# Patient Record
Sex: FEMALE | Race: WHITE | Employment: OTHER | URBAN - NONMETROPOLITAN AREA
[De-identification: names, ages, dates, MRNs, and addresses within clinical notes are randomized per-mention and may not be internally consistent; named-entity substitution may affect disease eponyms.]

---

## 2017-03-24 RX ORDER — FLECAINIDE ACETATE 50 MG/1
TABLET ORAL
Qty: 180 TABLET | Refills: 1 | Status: SHIPPED | OUTPATIENT
Start: 2017-03-24 | End: 2017-05-22 | Stop reason: SDUPTHER

## 2017-03-24 RX ORDER — METOPROLOL SUCCINATE 50 MG/1
TABLET, EXTENDED RELEASE ORAL
Qty: 90 TABLET | Refills: 1 | Status: SHIPPED | OUTPATIENT
Start: 2017-03-24 | End: 2017-05-22 | Stop reason: SDUPTHER

## 2017-03-24 RX ORDER — LISINOPRIL 20 MG/1
TABLET ORAL
Qty: 90 TABLET | Refills: 1 | Status: SHIPPED | OUTPATIENT
Start: 2017-03-24 | End: 2017-05-22 | Stop reason: SDUPTHER

## 2017-05-22 ENCOUNTER — OFFICE VISIT (OUTPATIENT)
Dept: CARDIOLOGY CLINIC | Age: 82
End: 2017-05-22

## 2017-05-22 VITALS
RESPIRATION RATE: 16 BRPM | HEART RATE: 47 BPM | DIASTOLIC BLOOD PRESSURE: 80 MMHG | SYSTOLIC BLOOD PRESSURE: 136 MMHG | WEIGHT: 148 LBS | HEIGHT: 60 IN | BODY MASS INDEX: 29.06 KG/M2

## 2017-05-22 DIAGNOSIS — I48.0 PAF (PAROXYSMAL ATRIAL FIBRILLATION) (HCC): Primary | ICD-10-CM

## 2017-05-22 DIAGNOSIS — R00.1 BRADYCARDIA: ICD-10-CM

## 2017-05-22 DIAGNOSIS — Z91.81 RISK FOR FALLS: ICD-10-CM

## 2017-05-22 DIAGNOSIS — I10 ESSENTIAL HYPERTENSION: ICD-10-CM

## 2017-05-22 DIAGNOSIS — H91.93 HOH (HARD OF HEARING), BILATERAL: ICD-10-CM

## 2017-05-22 PROCEDURE — 99214 OFFICE O/P EST MOD 30 MIN: CPT | Performed by: INTERNAL MEDICINE

## 2017-05-22 PROCEDURE — 1036F TOBACCO NON-USER: CPT | Performed by: INTERNAL MEDICINE

## 2017-05-22 PROCEDURE — G8420 CALC BMI NORM PARAMETERS: HCPCS | Performed by: INTERNAL MEDICINE

## 2017-05-22 PROCEDURE — 93000 ELECTROCARDIOGRAM COMPLETE: CPT | Performed by: INTERNAL MEDICINE

## 2017-05-22 PROCEDURE — G8428 CUR MEDS NOT DOCUMENT: HCPCS | Performed by: INTERNAL MEDICINE

## 2017-05-22 PROCEDURE — 1123F ACP DISCUSS/DSCN MKR DOCD: CPT | Performed by: INTERNAL MEDICINE

## 2017-05-22 PROCEDURE — 4040F PNEUMOC VAC/ADMIN/RCVD: CPT | Performed by: INTERNAL MEDICINE

## 2017-05-22 PROCEDURE — 1090F PRES/ABSN URINE INCON ASSESS: CPT | Performed by: INTERNAL MEDICINE

## 2017-05-22 RX ORDER — METOPROLOL SUCCINATE 25 MG/1
TABLET, EXTENDED RELEASE ORAL
Qty: 90 TABLET | Refills: 3 | Status: SHIPPED | OUTPATIENT
Start: 2017-05-22 | End: 2018-06-25 | Stop reason: SDUPTHER

## 2017-05-22 RX ORDER — FLECAINIDE ACETATE 50 MG/1
TABLET ORAL
Qty: 180 TABLET | Refills: 3 | Status: SHIPPED | OUTPATIENT
Start: 2017-05-22 | End: 2018-06-25 | Stop reason: SDUPTHER

## 2017-05-22 RX ORDER — LISINOPRIL 20 MG/1
TABLET ORAL
Qty: 90 TABLET | Refills: 3 | Status: SHIPPED | OUTPATIENT
Start: 2017-05-22 | End: 2018-06-25 | Stop reason: SDUPTHER

## 2017-05-23 ENCOUNTER — TELEPHONE (OUTPATIENT)
Dept: CARDIOLOGY CLINIC | Age: 82
End: 2017-05-23

## 2017-05-24 ENCOUNTER — TELEPHONE (OUTPATIENT)
Dept: CARDIOLOGY CLINIC | Age: 82
End: 2017-05-24

## 2018-06-25 ENCOUNTER — TELEPHONE (OUTPATIENT)
Dept: CARDIOLOGY CLINIC | Age: 83
End: 2018-06-25

## 2018-06-25 RX ORDER — METOPROLOL SUCCINATE 25 MG/1
TABLET, EXTENDED RELEASE ORAL
Qty: 90 TABLET | Refills: 3 | Status: SHIPPED | OUTPATIENT
Start: 2018-06-25 | End: 2019-06-01 | Stop reason: SDUPTHER

## 2018-06-25 RX ORDER — LISINOPRIL 20 MG/1
TABLET ORAL
Qty: 90 TABLET | Refills: 3 | Status: SHIPPED | OUTPATIENT
Start: 2018-06-25 | End: 2019-06-01 | Stop reason: SDUPTHER

## 2018-06-25 RX ORDER — FLECAINIDE ACETATE 50 MG/1
TABLET ORAL
Qty: 180 TABLET | Refills: 3 | Status: SHIPPED | OUTPATIENT
Start: 2018-06-25 | End: 2019-06-01 | Stop reason: SDUPTHER

## 2018-12-24 ENCOUNTER — HOSPITAL ENCOUNTER (EMERGENCY)
Age: 83
Discharge: HOME | End: 2018-12-24
Attending: EMERGENCY MEDICINE
Payer: MEDICARE

## 2018-12-24 ENCOUNTER — APPOINTMENT (OUTPATIENT)
Dept: GENERAL RADIOLOGY | Age: 83
End: 2018-12-24
Payer: MEDICARE

## 2018-12-24 VITALS
WEIGHT: 155 LBS | HEIGHT: 60 IN | OXYGEN SATURATION: 96 % | RESPIRATION RATE: 16 BRPM | SYSTOLIC BLOOD PRESSURE: 96 MMHG | TEMPERATURE: 101 F | HEART RATE: 93 BPM | BODY MASS INDEX: 30.43 KG/M2 | DIASTOLIC BLOOD PRESSURE: 77 MMHG

## 2018-12-24 DIAGNOSIS — N39.0 URINARY TRACT INFECTION WITH HEMATURIA, SITE UNSPECIFIED: ICD-10-CM

## 2018-12-24 DIAGNOSIS — R30.0 DYSURIA: Primary | ICD-10-CM

## 2018-12-24 DIAGNOSIS — R50.9 FEVER, UNSPECIFIED FEVER CAUSE: ICD-10-CM

## 2018-12-24 DIAGNOSIS — R65.10 SIRS (SYSTEMIC INFLAMMATORY RESPONSE SYNDROME) (HCC): ICD-10-CM

## 2018-12-24 DIAGNOSIS — R31.9 URINARY TRACT INFECTION WITH HEMATURIA, SITE UNSPECIFIED: ICD-10-CM

## 2018-12-24 LAB
BACTERIA: ABNORMAL /HPF
BILIRUBIN URINE: NEGATIVE MG/DL
BLOOD, URINE: ABNORMAL
CAST TYPE: ABNORMAL /HPF
CLARITY: ABNORMAL
COLOR: YELLOW
CRYSTAL TYPE: ABNORMAL /HPF
EPITHELIAL CELLS, UA: ABNORMAL /HPF
GLUCOSE, URINE: NEGATIVE MG/DL
KETONES, URINE: NEGATIVE MG/DL
LEUKOCYTE ESTERASE, URINE: ABNORMAL
MUCUS: NEGATIVE HPF
NITRITE URINE, QUANTITATIVE: NEGATIVE
PH, URINE: 5 (ref 5–8)
PROTEIN UA BY SSA: ABNORMAL MG% (ref 0–10)
PROTEIN UA: 30 MG/DL
RBC URINE: ABNORMAL /HPF (ref 0–6)
SPECIFIC GRAVITY UA: 1.02 (ref 1–1.03)
UROBILINOGEN, URINE: 0.2 MG/DL (ref 0.2–1)
VOLUME, (UVOL): 12 ML (ref 10–12)
WBC UA: ABNORMAL /HPF (ref 0–5)

## 2018-12-24 PROCEDURE — 87086 URINE CULTURE/COLONY COUNT: CPT

## 2018-12-24 PROCEDURE — 71045 X-RAY EXAM CHEST 1 VIEW: CPT

## 2018-12-24 PROCEDURE — 99283 EMERGENCY DEPT VISIT LOW MDM: CPT

## 2018-12-24 PROCEDURE — 87077 CULTURE AEROBIC IDENTIFY: CPT

## 2018-12-24 PROCEDURE — 87186 SC STD MICRODIL/AGAR DIL: CPT

## 2018-12-24 PROCEDURE — 6370000000 HC RX 637 (ALT 250 FOR IP): Performed by: EMERGENCY MEDICINE

## 2018-12-24 PROCEDURE — 81001 URINALYSIS AUTO W/SCOPE: CPT

## 2018-12-24 RX ORDER — CEPHALEXIN 500 MG/1
500 CAPSULE ORAL 2 TIMES DAILY
Qty: 20 CAPSULE | Refills: 0 | Status: SHIPPED | OUTPATIENT
Start: 2018-12-24 | End: 2019-01-03

## 2018-12-24 RX ORDER — 0.9 % SODIUM CHLORIDE 0.9 %
1000 INTRAVENOUS SOLUTION INTRAVENOUS ONCE
Status: DISCONTINUED | OUTPATIENT
Start: 2018-12-24 | End: 2018-12-24 | Stop reason: HOSPADM

## 2018-12-24 RX ORDER — CEPHALEXIN 500 MG/1
500 CAPSULE ORAL ONCE
Status: COMPLETED | OUTPATIENT
Start: 2018-12-24 | End: 2018-12-24

## 2018-12-24 RX ORDER — ACETAMINOPHEN 500 MG
1000 TABLET ORAL ONCE
Status: COMPLETED | OUTPATIENT
Start: 2018-12-24 | End: 2018-12-24

## 2018-12-24 RX ADMIN — ACETAMINOPHEN 1000 MG: 500 TABLET, FILM COATED ORAL at 20:27

## 2018-12-24 RX ADMIN — CEPHALEXIN 500 MG: 500 CAPSULE ORAL at 20:35

## 2018-12-24 ASSESSMENT — ENCOUNTER SYMPTOMS
RESPIRATORY NEGATIVE: 1
ALLERGIC/IMMUNOLOGIC NEGATIVE: 1
EYES NEGATIVE: 1
GASTROINTESTINAL NEGATIVE: 1

## 2018-12-24 NOTE — ED PROVIDER NOTES
[address] [facility]:   Dysuria (Arrives via private auto with her niece with complaints of cough,fever. ) and Fever      [place]:  Steve Paige is a 80 y.o. female that presents with a complaint of confusion, possible urinary tract infection. Patient was at home with niece. He states patient's otherwise healthy today noticed some increased confusion thought she had a UTI. Patient on arrival is alert and oriented ×3 she is in no distress she has no complaints and denies any falls or trauma. Patient denies any headache or chest pain or shortness of breath. He states patient has-been coughing. On arrival patient has a fever they were not aware home. No travel no contacts. No other questions or concerns. Patient denies abdominal pain diarrhea urinary complaints. No history of UTI. Kaushik Borden REVIEW OF SYSTEMS:  At least 10 systems reviewed and otherwise acutely negative except as in the [address] Review of Systems   Constitutional: Positive for fatigue. HENT: Negative. Eyes: Negative. Respiratory: Negative. Cardiovascular: Negative. Gastrointestinal: Negative. Endocrine: Negative. Genitourinary: Positive for dysuria. Musculoskeletal: Negative. Allergic/Immunologic: Negative. Neurological: Negative. Hematological: Negative. Psychiatric/Behavioral: Positive for confusion. All other systems reviewed and are negative.       Past Medical History:   Diagnosis Date    Bradycardia 5/22/2017    Glaucoma     H/O cardiovascular stress test 4/14/2011    normal pattern of perfusion in all regions, ef 70    H/O echocardiogram 4/14/2011    mild concentric lvh with preserved systolic and abnormal diastolic fxn BP>65    [place] (hard of hearing) 10/22/2014    Hypertension     PAF (paroxysmal atrial fibrillation) ([facility] [address]     resolved on Flecainide    Risk for falls 5/22/2017    Thyroid disease      Past Surgical History:   Procedure Laterality Date    HYSTERECTOMY exhibits no distension, no pulsatile midline mass and no mass. There is no tenderness. There is no rigidity, no rebound, no guarding, no tenderness at McBurney's point and negative Longoria's sign. No hernia. Musculoskeletal: Normal range of motion. She exhibits no edema, tenderness or deformity. Neurological: She is alert and oriented to person, place, and time. She has normal strength. She is not disoriented. She displays no atrophy and no tremor. No cranial nerve deficit or sensory deficit. She exhibits normal muscle tone. She displays no seizure activity. Coordination and gait normal. GCS eye subscore is 4. GCS verbal subscore is 5. GCS motor subscore is 6. Skin: Skin is warm. No rash noted. She is not diaphoretic. No erythema. No pallor. Psychiatric: She has a normal mood and affect. Her behavior is normal. Judgment and thought content normal.   Nursing note and vitals reviewed.       I have reviewed andinterpreted all of the currently available lab results from this visit (if applicable):    Results for orders placed or performed during the hospital encounter of 12/24/18   Urinalysis (Lab)   Result Value Ref Range    Color, UA YELLOW UYELL    Clarity, UA TURBID (A) CLEAR    Glucose, Urine NEGATIVE NEG MG/DL    Bilirubin Urine NEGATIVE NEG MG/DL    Ketones, Urine NEGATIVE NEG MG/DL    Specific Gravity, UA 1.025 1.001 - 1.035    Blood, Urine LARGE (A) NEG    pH, Urine 5.0 5.0 - 8.0    Protein, UA 30 (A) NEG MG/DL    Urobilinogen, Urine 0.2 0.2 - 1.0 MG/DL    Nitrite Urine, Quantitative NEGATIVE NEG    Leukocyte Esterase, Urine LARGE (A) NEG    Volume, (UVOL) 12 10 - 12 ML    PROTEIN UA BY SSA NOT PERFORMED 0 - 10 MG%    RBC, UA 0 TO 3 0 - 6 /HPF    WBC, UA 15 TO 25 0 - 5 /HPF    Epi Cells 0 TO 2 /HPF    Cast Type NO CAST FORMS SEEN NCFS /HPF    Bacteria, UA MODERATE (A) NEG /HPF    Crystal Type NONE SEEN NEG /HPF    Mucus, UA NEGATIVE NEG HPF        Radiographs (if obtained):  [] The following radiograph

## 2018-12-27 LAB
CULTURE: NORMAL
Lab: NORMAL
ORGANISM: NORMAL
REPORT STATUS: NORMAL
SPECIMEN: NORMAL
TOTAL COLONY COUNT: NORMAL

## 2019-06-03 RX ORDER — FLECAINIDE ACETATE 50 MG/1
TABLET ORAL
Qty: 180 TABLET | Refills: 2 | Status: SHIPPED | OUTPATIENT
Start: 2019-06-03 | End: 2020-03-09 | Stop reason: SDUPTHER

## 2019-06-03 RX ORDER — LISINOPRIL 20 MG/1
TABLET ORAL
Qty: 90 TABLET | Refills: 2 | Status: SHIPPED | OUTPATIENT
Start: 2019-06-03 | End: 2020-03-09 | Stop reason: SDUPTHER

## 2019-06-03 RX ORDER — METOPROLOL SUCCINATE 25 MG/1
TABLET, EXTENDED RELEASE ORAL
Qty: 90 TABLET | Refills: 2 | Status: SHIPPED | OUTPATIENT
Start: 2019-06-03 | End: 2020-03-09 | Stop reason: SDUPTHER

## 2020-01-29 ENCOUNTER — HOSPITAL ENCOUNTER (OUTPATIENT)
Dept: GENERAL RADIOLOGY | Age: 85
Discharge: HOME | End: 2020-01-29
Payer: MEDICARE

## 2020-01-29 ENCOUNTER — HOSPITAL ENCOUNTER (OUTPATIENT)
Dept: ULTRASOUND IMAGING | Age: 85
Discharge: HOME | End: 2020-01-29
Payer: MEDICARE

## 2020-01-29 PROCEDURE — 73590 X-RAY EXAM OF LOWER LEG: CPT

## 2020-01-29 PROCEDURE — 93971 EXTREMITY STUDY: CPT

## 2020-03-09 RX ORDER — FLECAINIDE ACETATE 50 MG/1
TABLET ORAL
Qty: 180 TABLET | Refills: 0 | Status: SHIPPED | OUTPATIENT
Start: 2020-03-09 | End: 2020-05-27

## 2020-03-09 RX ORDER — LISINOPRIL 20 MG/1
TABLET ORAL
Qty: 90 TABLET | Refills: 2 | Status: SHIPPED | OUTPATIENT
Start: 2020-03-09 | End: 2020-08-04 | Stop reason: SDUPTHER

## 2020-03-09 RX ORDER — METOPROLOL SUCCINATE 25 MG/1
TABLET, EXTENDED RELEASE ORAL
Qty: 90 TABLET | Refills: 2 | Status: SHIPPED | OUTPATIENT
Start: 2020-03-09 | End: 2020-08-04 | Stop reason: SDUPTHER

## 2020-05-27 RX ORDER — FLECAINIDE ACETATE 50 MG/1
TABLET ORAL
Qty: 180 TABLET | Refills: 0 | Status: SHIPPED | OUTPATIENT
Start: 2020-05-27 | End: 2020-08-04 | Stop reason: SDUPTHER

## 2020-08-04 ENCOUNTER — OFFICE VISIT (OUTPATIENT)
Dept: CARDIOLOGY CLINIC | Age: 85
End: 2020-08-04
Payer: MEDICARE

## 2020-08-04 VITALS
SYSTOLIC BLOOD PRESSURE: 118 MMHG | DIASTOLIC BLOOD PRESSURE: 60 MMHG | TEMPERATURE: 97.5 F | HEART RATE: 57 BPM | WEIGHT: 134 LBS | BODY MASS INDEX: 26.17 KG/M2

## 2020-08-04 PROBLEM — I49.9 IRREGULAR HEARTBEAT: Status: ACTIVE | Noted: 2020-08-04

## 2020-08-04 PROCEDURE — 99214 OFFICE O/P EST MOD 30 MIN: CPT | Performed by: INTERNAL MEDICINE

## 2020-08-04 PROCEDURE — 93000 ELECTROCARDIOGRAM COMPLETE: CPT | Performed by: INTERNAL MEDICINE

## 2020-08-04 RX ORDER — METOPROLOL SUCCINATE 25 MG/1
TABLET, EXTENDED RELEASE ORAL
Qty: 90 TABLET | Refills: 2 | Status: SHIPPED | OUTPATIENT
Start: 2020-08-04 | End: 2021-08-03 | Stop reason: SDUPTHER

## 2020-08-04 RX ORDER — LISINOPRIL 20 MG/1
TABLET ORAL
Qty: 90 TABLET | Refills: 2 | Status: SHIPPED | OUTPATIENT
Start: 2020-08-04 | End: 2021-08-03 | Stop reason: SDUPTHER

## 2020-08-04 RX ORDER — FLECAINIDE ACETATE 50 MG/1
50 TABLET ORAL 2 TIMES DAILY
Qty: 180 TABLET | Refills: 3
Start: 2020-08-04 | End: 2021-08-03 | Stop reason: SDUPTHER

## 2020-08-04 NOTE — PATIENT INSTRUCTIONS
Continue current medications and use maximum fall precautions. Appropriate prescriptions if needed on this visit are addressed. After visit summery is provided. Questions answered and patient verbalizes understanding. Follow up in 12 months with EKG,  sooner if needed.

## 2020-08-04 NOTE — ASSESSMENT & PLAN NOTE
Has been stable with the first-degree heart block and intraventricular conduction delay and left posterior hemiblock.

## 2020-08-04 NOTE — PROGRESS NOTES
Gallito Ramirez  5/12/1919  Steven Green MD    Chief complaint and HPI:  Gallito Ramirez  is a 80 y.o. female following up for history of paroxysmal atrial fibrillation and a very abnormal EKG with first-degree heart block and left posterior hemiblock and nonspecific intraventricular conduction delay and hypertension. She hasn't been seen since May 2017. She denies any palpitations and she does great for her extreme age. She has fallen a few times and walks with a cane and not on any anticoagulation therapy for this reason. She doesn't smoke. She has been compliant to her medications. Rest of the Cardiovascular system review is otherwise unchanged from prior encounter. Past medical history:  has a past medical history of Bradycardia, Glaucoma, H/O cardiovascular stress test, H/O echocardiogram, [place] (hard of hearing), Hypertension, PAF (paroxysmal atrial fibrillation) ([facility] [address] Risk for falls, and Thyroid disease. Past surgical history:  has a past surgical history that includes Hysterectomy. Social History:   Social History     Tobacco Use    Smoking status: Never Smoker    Smokeless tobacco: Never Used   Substance Use Topics    Alcohol use: No     Comment: Tea daily     Family history: family history includes Cancer in her brother, sister, and sister; Heart Disease in her brother and sister; High Blood Pressure in her brother and sister; Kidney Disease in her mother; Stroke in her father. ALLERGIES:  Patient has no known allergies. Prior to Admission medications    Medication Sig Start Date End Date Taking?  Authorizing Provider   flecainide (TAMBOCOR) 50 MG tablet Take 1 tablet by mouth 2 times daily 8/4/20  Yes Luiz Smith MD   metoprolol succinate (TOPROL XL) 25 MG extended release tablet TAKE ONE TABLET BY MOUTH DAILY 8/4/20  Yes Luiz Smith MD   lisinopril (PRINIVIL;ZESTRIL) 20 MG tablet TAKE ONE TABLET BY MOUTH DAILY 8/4/20  Yes Luiz Smith MD   multivitamin [facility]) per tablet Take 1 tablet by mouth daily. Yes Historical Provider, MD     Vitals:    08/04/20 0903   BP: 118/60   Pulse: 57   Temp: 97.5 °F (36.4 °C)   Weight: 134 lb (60.8 kg)      Body mass index is 26.17 kg/m². Wt Readings from Last 3 Encounters:   08/04/20 134 lb (60.8 kg)   12/24/18 155 lb (70.3 kg)   05/22/17 148 lb (67.1 kg)     Constitutional:  Patient is pleasant elderly female normally built and nourished walk with a cane . Eyes:  Pupils are equal no conjunctival pallor noted. HEENT: She wears glasses and waning facemask and has bilateral hearing aids . NECK: No JVP or thyromegaly  Cardiovascular: Auscultation: Normal S1 and S2.  1/6 systolic murmur noted in the left sternal border and second intercostal space. Carotids are free of bruits. Abdominal aorta is nonpalpable. noic bruit noted. Respiratory:  Respiratory effort is normal . Breath sounds are clear to auscultation . Extremities:   bilateral 1+ pitting edema noted around the ankles and lower legs. SKIN: Warm and well perfused, no pallor or cyanosis  Abdomen:  No masses or tenderness. No organomegaly noted. Musculoskeletal:  No unusual deformities noted. Gait is unsteady walks with a cane. Muscle strength is normal for age . Neurologic:  Oriented to time, place, and person and non-anxious. No focal neurological deficit noted. she is extremely hard of hearing   Psychiatric: Normal mood and effect.      EKG today is consistent with sinus bradycardia 57 bpm with first-degree heart block nonspecific intraventricular conduction delay and right axis deviation suggesting posterior hemiblock    LAB REVIEW:  CBC:   Lab Results   Component Value Date    WBC 5.8 03/06/2011    HGB 11.7 03/06/2011    HCT 34.1 03/06/2011     03/06/2011     Renal:   Lab Results   Component Value Date    BUN 23 03/06/2011    CREATININE 1.2 03/06/2011     03/06/2011    K 4.2 03/06/2011     IMPRESSION and RECOMMENDATIONS:      Hypertension  Well controlled on the lisinopril and Toprol continue the same. PAF (paroxysmal atrial fibrillation)  Well-controlled on flecainide and Toprol. Continue the same. Continue to monitor at least annually sooner if she has symptoms of recurrence of palpitations or shortness of breath and fatigue or feeling irregular heartbeat. She will bless understanding. Abnormal ECG  Has been stable with the first-degree heart block and intraventricular conduction delay and left posterior hemiblock. Continue current medications and use maximum fall precautions. Appropriate prescriptions if needed on this visit are addressed. After visit summery is provided. Questions answered and patient verbalizes understanding. Follow up in 12 months with EKG,  sooner if needed. Mckenzie Figueroa MD, 8/4/2020 9:41 AM     Please note this report has been partially produced using speech recognition software and may contain errors related to that system including errors in grammar, punctuation, and spelling, as well as words and phrases that may be inappropriate. If there are any questions or concerns please feel free to contact the dictating provider for clarification.

## 2021-05-15 ENCOUNTER — HOSPITAL ENCOUNTER (EMERGENCY)
Age: 86
Discharge: HOME | End: 2021-05-15
Attending: EMERGENCY MEDICINE
Payer: MEDICARE

## 2021-05-15 ENCOUNTER — APPOINTMENT (OUTPATIENT)
Dept: GENERAL RADIOLOGY | Age: 86
End: 2021-05-15
Payer: MEDICARE

## 2021-05-15 VITALS
WEIGHT: 132.5 LBS | HEART RATE: 66 BPM | SYSTOLIC BLOOD PRESSURE: 159 MMHG | BODY MASS INDEX: 23.48 KG/M2 | OXYGEN SATURATION: 99 % | DIASTOLIC BLOOD PRESSURE: 83 MMHG | RESPIRATION RATE: 15 BRPM | HEIGHT: 63 IN
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
## 2021-05-15 LAB
ALBUMIN SERPL-MCNC: 3.6 GM/DL (ref 3.4–5)
ALP BLD-CCNC: 81 IU/L (ref 40–129)
ALT SERPL-CCNC: 10 U/L (ref 10–40)
ANION GAP SERPL CALCULATED.3IONS-SCNC: 6 MMOL/L (ref 4–16)
AST SERPL-CCNC: 20 IU/L (ref 15–37)
BASOPHILS ABSOLUTE: 0.1 K/CU MM
BASOPHILS RELATIVE PERCENT: 0.7 % (ref 0–1)
BILIRUB SERPL-MCNC: 0.4 MG/DL (ref 0–1)
BUN BLDV-MCNC: 24 MG/DL (ref 6–23)
CALCIUM SERPL-MCNC: 9.6 MG/DL (ref 8.3–10.6)
CHLORIDE BLD-SCNC: 103 MMOL/L (ref 99–110)
CO2: 31 MMOL/L (ref 21–32)
CREAT SERPL-MCNC: 1.1 MG/DL (ref 0.6–1.1)
DIFFERENTIAL TYPE: ABNORMAL
EOSINOPHILS ABSOLUTE: 0.1 K/CU MM
EOSINOPHILS RELATIVE PERCENT: 1.9 % (ref 0–3)
GFR AFRICAN AMERICAN: 55 ML/MIN/1.73M2
GFR NON-AFRICAN AMERICAN: 45 ML/MIN/1.73M2
GLUCOSE BLD-MCNC: 89 MG/DL (ref 70–99)
HCT VFR BLD CALC: 36.1 % (ref 37–47)
HEMOGLOBIN: 11.4 GM/DL (ref 12.5–16)
IMMATURE NEUTROPHIL %: 0.1 % (ref 0–0.43)
LYMPHOCYTES ABSOLUTE: 2.3 K/CU MM
LYMPHOCYTES RELATIVE PERCENT: 32.4 % (ref 24–44)
MCH RBC QN AUTO: 28 PG (ref 27–31)
MCHC RBC AUTO-ENTMCNC: 31.6 % (ref 32–36)
MCV RBC AUTO: 88.7 FL (ref 78–100)
MONOCYTES ABSOLUTE: 0.8 K/CU MM
MONOCYTES RELATIVE PERCENT: 10.9 % (ref 0–4)
PDW BLD-RTO: 14.6 % (ref 11.7–14.9)
PLATELET # BLD: 211 K/CU MM (ref 140–440)
PMV BLD AUTO: 9.4 FL (ref 7.5–11.1)
POTASSIUM SERPL-SCNC: 4.7 MMOL/L (ref 3.5–5.1)
RBC # BLD: 4.07 M/CU MM (ref 4.2–5.4)
SEGMENTED NEUTROPHILS ABSOLUTE COUNT: 3.8 K/CU MM
SEGMENTED NEUTROPHILS RELATIVE PERCENT: 54 % (ref 36–66)
SODIUM BLD-SCNC: 140 MMOL/L (ref 135–145)
TOTAL IMMATURE NEUTOROPHIL: 0.01 K/CU MM
TOTAL PROTEIN: 7.8 GM/DL (ref 6.4–8.2)
TROPONIN T: 0.01 NG/ML
TROPONIN T: 0.01 NG/ML
WBC # BLD: 7 K/CU MM (ref 4–10.5)

## 2021-05-15 PROCEDURE — 99285 EMERGENCY DEPT VISIT HI MDM: CPT

## 2021-05-15 PROCEDURE — 93005 ELECTROCARDIOGRAM TRACING: CPT | Performed by: EMERGENCY MEDICINE

## 2021-05-15 PROCEDURE — 85025 COMPLETE CBC W/AUTO DIFF WBC: CPT

## 2021-05-15 PROCEDURE — 84484 ASSAY OF TROPONIN QUANT: CPT

## 2021-05-15 PROCEDURE — 71045 X-RAY EXAM CHEST 1 VIEW: CPT

## 2021-05-15 PROCEDURE — 80053 COMPREHEN METABOLIC PANEL: CPT

## 2021-05-15 NOTE — ED PROVIDER NOTES
eMERGENCY dEPARTMENT eNCOUnter      PCP: Jeaneth Silva MD    CHIEF COMPLAINT    Chief Complaint   Patient presents with    Loss of Consciousness     pt was at [facility] had a syncopal episode at [facility]       HPI    Martinez Hall is a 80 y.o. female who presents with possible syncopal episode. She is not sure if she actually passed out. She states she does remember everything that occurred. Reports that she walked with assistance up to the old her at [facility] to get communion. States that she had just been given her communion when she \"went down. \"  She denies feeling dizzy, lightheaded, having chest pain, shortness of breath or palpitations. States she went down to her knees. States she did not fully fall. States she has been in good health recently otherwise. REVIEW OF SYSTEMS    Constitutional:  Denies fever, chills.    HENT:  Denies sore throat or ear pain   Cardiovascular:  Denies chest pain, palpitations   Respiratory:  Denies cough or shortness of breath    GI:  Denies abdominal pain, nausea, vomiting, or diarrhea  :  Denies any urinary symptoms, flank pain  Musculoskeletal:  Denies back pain, extremity pain  Skin:  Denies rash, color change  Neurologic:  Denies headache, focal weakness or sensory changes   Lymphatic:  Denies swollen glands, edema    All other review of systems are negative  See HPI and nursing notes for additional information     PAST MEDICAL AND SURGICAL HISTORY    Past Medical History:   Diagnosis Date    Bradycardia 5/22/2017    Glaucoma     H/O cardiovascular stress test 4/14/2011    normal pattern of perfusion in all regions, ef 70    H/O echocardiogram 4/14/2011    mild concentric lvh with preserved systolic and abnormal diastolic fxn SR>39    [place] (hard of hearing) 10/22/2014    Hypertension     PAF (paroxysmal atrial fibrillation) ([facility] [address]     resolved on Flecainide    Risk for falls 5/22/2017    Thyroid disease      Past Surgical History:   Procedure Laterality Date    HYSTERECTOMY         CURRENT MEDICATIONS    Current Outpatient Rx   Medication Sig Dispense Refill    flecainide (TAMBOCOR) 50 MG tablet Take 1 tablet by mouth 2 times daily 180 tablet 3    metoprolol succinate (TOPROL XL) 25 MG extended release tablet TAKE ONE TABLET BY MOUTH DAILY 90 tablet 2    lisinopril (PRINIVIL;ZESTRIL) 20 MG tablet TAKE ONE TABLET BY MOUTH DAILY 90 tablet 2    multivitamin (THERAGRAN) per tablet Take 1 tablet by mouth daily. ALLERGIES    No Known Allergies    SOCIAL AND FAMILY HISTORY    Social History     Socioeconomic History    Marital status:      Spouse name: None    Number of children: 0    Years of education: None    Highest education level: None   Occupational History    Occupation: volunteer   Tobacco Use    Smoking status: Never Smoker    Smokeless tobacco: Never Used   Vaping Use    Vaping Use: Never used   Substance and Sexual Activity    Alcohol use: No     Comment: Tea daily    Drug use: No    Sexual activity: None   Other Topics Concern    None   Social History Narrative    None     Social Determinants of Health     Financial Resource Strain:     Difficulty of Paying Living Expenses:    Food Insecurity:     Worried About Running Out of Food in the Last Year:     Ran Out of Food in the Last Year:    Transportation Needs:     Lack of Transportation (Medical):      Lack of Transportation (Non-Medical):    Physical Activity:     Days of Exercise per Week:     Minutes of Exercise per Session:    Stress:     Feeling of Stress :    Social Connections:     Frequency of Communication with Friends and Family:     Frequency of Social Gatherings with Friends and Family:     Attends Oriental orthodox Services:     Active Member of Clubs or Organizations:     Attends Club or Organization Meetings:     Marital Status:    Intimate Partner Violence:     Fear of Current or Ex-Partner:     Emotionally Abused:     Physically Abused:     Sexually Abused:      Family History   Problem Relation Age of Onset    Kidney Disease Mother     Stroke Father     Heart Disease Sister     High Blood Pressure Sister     Heart Disease Brother     High Blood Pressure Brother     Cancer Brother     Cancer Sister     Cancer Sister          PHYSICAL EXAM    VITAL SIGNS: Pulse 60   Resp 18   Ht 5' 3\" (1.6 m)   Wt 132 lb 8 oz (60.1 kg)   SpO2 95%   BMI 23.47 kg/m²    Constitutional:  Well developed, No acute distress. HENT:  Normocephalic, Atraumatic, PERRL. EOMI. Sclera clear. Conjunctiva normal.  Neck: supple without ridigity  Cardiovascular:  Regular rate and rhythm, No murmurs  Respiratory:  Nonlabored breathing. Normal breath sounds  Abdomen: Soft, Non tender, bowel sounds present. Musculoskeletal: No edema, No tenderness  Integument:  Warm, Dry, no rash  Neurologic:  Alert & oriented , No focal deficits noted. Speech normal.  Psychiatric:  Affect normal, Mood normal.       Labs:  Labs Reviewed   CBC WITH AUTO DIFFERENTIAL - Abnormal; Notable for the following components:       Result Value    RBC 4.07 (*)     Hemoglobin 11.4 (*)     Hematocrit 36.1 (*)     MCHC 31.6 (*)     Monocytes % 10.9 (*)     All other components within normal limits   COMPREHENSIVE METABOLIC PANEL - Abnormal; Notable for the following components:    BUN 24 (*)     GFR Non- 45 (*)     GFR  55 (*)     All other components within normal limits   TROPONIN - Abnormal; Notable for the following components:    Troponin T 0.012 (*)     All other components within normal limits   TROPONIN - Abnormal; Notable for the following components:    Troponin T 0.012 (*)     All other components within normal limits         EKG    This EKG was interpreted by me. Rate is 55, rhythm is sinus with a VA of 254. Nonspecific intraventricular conduction delay. T wave abnormalities in lead III, aVF, V5-6. QT intervals are within normal limits.  There is no ST segment or T wave changes. This EKG was compared to previous EKG from date 8/4/2020. Appears similar to previous EKG. RADIOLOGY    XR CHEST PORTABLE    Result Date: 5/15/2021  EXAMINATION: ONE XRAY VIEW OF THE CHEST 5/15/2021 12:24 pm COMPARISON: 12/24/2018 HISTORY: ORDERING SYSTEM PROVIDED HISTORY: syncope TECHNOLOGIST PROVIDED HISTORY: Reason for exam:->syncope Additional signs and symptoms: loc Initial exam FINDINGS: There are low lung volumes. No focal infiltrate, pleural effusion or pneumothorax is demonstrated. The heart is enlarged. No acute osseous abnormality is seen. Low lung volumes. No acute cardiopulmonary disease. ED COURSE & MEDICAL DECISION MAKING       Vital signs and nursing notes reviewed during ED course. All pertinent Lab data and radiographic results reviewed with patient at bedside. The patient and/or the family were informed of the results of any tests/labs/imaging, the treatment plan, and time was allotted to answer questions. This is 8-year-old female who presented following a fall. Initially it was thought that she was syncopal or near syncopal.  She denies loss of consciousness to me. She is very lucid, able to provide adequate history. Work-up was initiated. Troponin was 0.012. This was repeated after 3 hours and was the same. I do suspect is likely due to renal insufficiency, as it did not change at all. I did speak with the patient's niece over the phone, whom the patient lives with. She states that she is supposed to use a walker, did not use a walker to walk up to the [place] [id number] at [facility], and because of this fell. She denied that she syncopized or loss consciousness. She states she does this often if she does not use her walker. Given the above further history I do feel that discharge is reasonable.   She is instructed to follow-up outpatient with a primary care provider and if she has any new or worsening signs or symptoms return to the emergency department for reevaluation.       Clinical  IMPRESSION    Fall          (Please note the MDM and HPI sections of this note were completed with a voice recognition program.  Efforts were made to edit the dictations but occasionally words are mis-transcribed.)      Raul Acevedo DO  05/15/21 0361

## 2021-05-15 NOTE — ED NOTES
Discharge instructions reviewed with patient & family. Reviewed prescriptions with patient & family. No additional questions asked. Voiced understanding. Encouraged patient & family to follow up as discussed by the ED physician.      William Navas RN  05/15/21 [id number]

## 2021-05-17 LAB
EKG ATRIAL RATE: 55 BPM
EKG DIAGNOSIS: NORMAL
EKG P AXIS: 38 DEGREES
EKG P-R INTERVAL: 254 MS
EKG Q-T INTERVAL: 444 MS
EKG QRS DURATION: 140 MS
EKG QTC CALCULATION (BAZETT): 424 MS
EKG R AXIS: 14 DEGREES
EKG T AXIS: -42 DEGREES
EKG VENTRICULAR RATE: 55 BPM

## 2021-05-17 PROCEDURE — 93010 ELECTROCARDIOGRAM REPORT: CPT | Performed by: INTERNAL MEDICINE

## 2021-08-03 ENCOUNTER — OFFICE VISIT (OUTPATIENT)
Dept: CARDIOLOGY CLINIC | Age: 86
End: 2021-08-03
Payer: MEDICARE

## 2021-08-03 VITALS
BODY MASS INDEX: 26.41 KG/M2 | HEART RATE: 52 BPM | WEIGHT: 131 LBS | SYSTOLIC BLOOD PRESSURE: 112 MMHG | DIASTOLIC BLOOD PRESSURE: 72 MMHG | HEIGHT: 59 IN

## 2021-08-03 DIAGNOSIS — R00.1 BRADYCARDIA: ICD-10-CM

## 2021-08-03 DIAGNOSIS — Z91.81 RISK FOR FALLS: ICD-10-CM

## 2021-08-03 DIAGNOSIS — I10 ESSENTIAL HYPERTENSION: Primary | ICD-10-CM

## 2021-08-03 DIAGNOSIS — I48.0 PAF (PAROXYSMAL ATRIAL FIBRILLATION) (HCC): ICD-10-CM

## 2021-08-03 PROCEDURE — 99214 OFFICE O/P EST MOD 30 MIN: CPT | Performed by: INTERNAL MEDICINE

## 2021-08-03 RX ORDER — METOPROLOL SUCCINATE 25 MG/1
TABLET, EXTENDED RELEASE ORAL
Qty: 90 TABLET | Refills: 2 | Status: SHIPPED | OUTPATIENT
Start: 2021-08-03

## 2021-08-03 RX ORDER — SERTRALINE HYDROCHLORIDE 25 MG/1
TABLET, FILM COATED ORAL
COMMUNITY
Start: 2021-07-24 | End: 2021-08-03 | Stop reason: CLARIF

## 2021-08-03 RX ORDER — LISINOPRIL 20 MG/1
TABLET ORAL
Qty: 90 TABLET | Refills: 2 | Status: SHIPPED | OUTPATIENT
Start: 2021-08-03

## 2021-08-03 RX ORDER — FLECAINIDE ACETATE 50 MG/1
50 TABLET ORAL 2 TIMES DAILY
Qty: 180 TABLET | Refills: 3 | Status: SHIPPED | OUTPATIENT
Start: 2021-08-03

## 2021-08-03 NOTE — ASSESSMENT & PLAN NOTE
Well-controlled on flecainide and Toprol. Continue the same. She is tolerating it well. Recent QTC intervals on EKG is acceptable.

## 2021-08-03 NOTE — PROGRESS NOTES
Navdeep Carranza (:  1919) is a 80 y.o. female,     Chief Complaint   Patient presents with    1 Year Follow Up     patient denies chest pain, palpitations, dizziness, SOB. she does have slight bilateral ankle edema. she has had falls due to unsteadiness or trying to walk without her walker. she walks for exercise daily. Patient is here for follow up for paroxysmal atrial fibrillation on flecainide therapy and has hypertension and history of bradycardia. She denies any cardiac symptoms today. She walks with a walker and mild every day 5 days a week. She is extremely hard of hearing otherwise does very well. She has been compliant with her medications and she is monitoring. she has not taken COVID-19 vaccinations. Medications are reviewed. She was in emergency room for fall just on 5/15/2021 records are reviewed. No Known Allergies  Prior to Admission medications    Medication Sig Start Date End Date Taking? Authorizing Provider   flecainide (TAMBOCOR) 50 MG tablet Take 1 tablet by mouth 2 times daily 8/3/21  Yes Moraima Dunn MD   lisinopril (PRINIVIL;ZESTRIL) 20 MG tablet TAKE ONE TABLET BY MOUTH DAILY 8/3/21  Yes Moraima Dunn MD   metoprolol succinate (TOPROL XL) 25 MG extended release tablet TAKE ONE TABLET BY MOUTH DAILY 8/3/21  Yes Moraima Dunn MD   multivitamin [facility]) per tablet Take 1 tablet by mouth daily.      Yes Historical Provider, MD     Past Medical History:   Diagnosis Date    Bradycardia 2017    Glaucoma     H/O cardiovascular stress test 2011    normal pattern of perfusion in all regions, ef 70    H/O echocardiogram 2011    mild concentric lvh with preserved systolic and abnormal diastolic fxn VT>43    [place] (hard of hearing) 10/22/2014    Hypertension     PAF (paroxysmal atrial fibrillation) ([facility] [address]     resolved on Flecainide    Risk for falls 2017    Thyroid disease       Vitals:    21 1004   BP: 112/72   Pulse: 52   Weight: 131 lb (59.4 kg)   Height: 4' 11\" (1.499 m)      Body mass index is 26.46 kg/m². Wt Readings from Last 3 Encounters:   08/03/21 131 lb (59.4 kg)   05/15/21 132 lb 8 oz (60.1 kg)   08/04/20 134 lb (60.8 kg)     Constitutional:  Patient is elderly female in no apparent distress   HEENT: Face mask and bilateral hearing aids and is still very hard of hearing. Cardiovascular: Auscultation: Normal S1 and S2. No significant murmurs noted. Respiratory:  Respiratory effort is normal. Breath sounds are clear to auscultation. Extremities: 1+ edema noted on left ankle and left foot and is tender to palpation. Trace edema noted in the right ankle. Neurologic:  Oriented to time, place, and person and non-anxious. No focal neurological deficit noted. Psychiatric: Normal mood and effect. EKG done 5/2021 reported Sinus bradycardia 55 bpm  Nonspecific intraventricular block, QTc 448 msec  T wave abnormality, consider inferolateral ischemia  Abnormal ECG    Pertinent records reviewed and discussed with patient and results are as follow:    Lab Results   Component Value Date    WBC 7.0 05/15/2021    HGB 11.4 05/15/2021    HCT 36.1 05/15/2021     05/15/2021     No results found for: CHOL, CHOLFAST, TRIG, TRIGLYCFAST, HDL, LDLCALC, LDLDIRECT  Lab Results   Component Value Date    BUN 24 05/15/2021    CREATININE 1.1 05/15/2021     05/15/2021    K 4.7 05/15/2021     ASSESSMENT/PLAN:    1. Essential hypertension  Assessment & Plan:  Blood pressure is well controlled on lisinopril 20 mg daily. Continue the same. 2. PAF (paroxysmal atrial fibrillation) ([facility])  Assessment & Plan:  Well-controlled on flecainide and Toprol. Continue the same. She is tolerating it well. Recent QTC intervals on EKG is acceptable. 3. Risk for falls  Assessment & Plan: For maximum fall precautions. 4. Bradycardia  Assessment & Plan:  Not symptomatic. He denies dizziness or syncope or near syncope. Continue to monitor. Continue current cardiovascular medications which have been reviewed and discussed individually with you. Follow-up in 12 months with EKG, sooner if needed. An electronic signature was used to authenticate this note.     --Uzma Nolasco MD

## 2021-08-03 NOTE — PATIENT INSTRUCTIONS
Continue current cardiovascular medications which have been reviewed and discussed individually with you. Follow-up in 12 months with EKG, sooner if needed.

## 2021-09-16 ENCOUNTER — HOSPITAL ENCOUNTER (INPATIENT)
Age: 86
LOS: 4 days | Discharge: NURSING, REHAB OR LONG-TERM CARE | DRG: 193 | End: 2021-09-21
Attending: EMERGENCY MEDICINE | Admitting: HOSPITALIST
Payer: MEDICARE

## 2021-09-16 ENCOUNTER — APPOINTMENT (OUTPATIENT)
Dept: GENERAL RADIOLOGY | Age: 86
DRG: 193 | End: 2021-09-16
Payer: MEDICARE

## 2021-09-16 ENCOUNTER — APPOINTMENT (OUTPATIENT)
Dept: CT IMAGING | Age: 86
DRG: 193 | End: 2021-09-16
Payer: MEDICARE

## 2021-09-16 DIAGNOSIS — B34.8 RHINOVIRUS: ICD-10-CM

## 2021-09-16 DIAGNOSIS — N17.9 AKI (ACUTE KIDNEY INJURY) (HCC): ICD-10-CM

## 2021-09-16 DIAGNOSIS — J96.01 ACUTE RESPIRATORY FAILURE WITH HYPOXEMIA (HCC): ICD-10-CM

## 2021-09-16 DIAGNOSIS — B33.8 RESPIRATORY SYNCYTIAL VIRUS (RSV): ICD-10-CM

## 2021-09-16 DIAGNOSIS — R41.0 DELIRIUM: ICD-10-CM

## 2021-09-16 DIAGNOSIS — J18.9 PNEUMONIA OF BOTH LOWER LOBES DUE TO INFECTIOUS ORGANISM: ICD-10-CM

## 2021-09-16 DIAGNOSIS — J18.9 COMMUNITY ACQUIRED PNEUMONIA, UNSPECIFIED LATERALITY: Primary | ICD-10-CM

## 2021-09-16 LAB
ADENOVIRUS DETECTION BY PCR: NOT DETECTED
ALBUMIN SERPL-MCNC: 3.6 GM/DL (ref 3.4–5)
ALP BLD-CCNC: 69 IU/L (ref 40–129)
ALT SERPL-CCNC: 13 U/L (ref 10–40)
ANION GAP SERPL CALCULATED.3IONS-SCNC: 12 MMOL/L (ref 4–16)
AST SERPL-CCNC: 33 IU/L (ref 15–37)
BASOPHILS ABSOLUTE: 0.1 K/CU MM
BASOPHILS RELATIVE PERCENT: 0.7 % (ref 0–1)
BILIRUB SERPL-MCNC: 0.3 MG/DL (ref 0–1)
BORDETELLA PARAPERTUSSIS BY PCR: NOT DETECTED
BORDETELLA PERTUSSIS PCR: NOT DETECTED
BUN BLDV-MCNC: 38 MG/DL (ref 6–23)
CALCIUM SERPL-MCNC: 9.2 MG/DL (ref 8.3–10.6)
CHLAMYDOPHILA PNEUMONIA PCR: NOT DETECTED
CHLORIDE BLD-SCNC: 97 MMOL/L (ref 99–110)
CO2: 24 MMOL/L (ref 21–32)
CORONAVIRUS 229E PCR: NOT DETECTED
CORONAVIRUS HKU1 PCR: NOT DETECTED
CORONAVIRUS NL63 PCR: NOT DETECTED
CORONAVIRUS OC43 PCR: NOT DETECTED
CREAT SERPL-MCNC: 1.6 MG/DL (ref 0.6–1.1)
DIFFERENTIAL TYPE: ABNORMAL
EOSINOPHILS ABSOLUTE: 0.3 K/CU MM
EOSINOPHILS RELATIVE PERCENT: 3.2 % (ref 0–3)
GFR AFRICAN AMERICAN: 36 ML/MIN/1.73M2
GFR NON-AFRICAN AMERICAN: 30 ML/MIN/1.73M2
GLUCOSE BLD-MCNC: 91 MG/DL (ref 70–99)
HCT VFR BLD CALC: 36.8 % (ref 37–47)
HEMOGLOBIN: 11.7 GM/DL (ref 12.5–16)
HUMAN METAPNEUMOVIRUS PCR: NOT DETECTED
IMMATURE NEUTROPHIL %: 0.2 % (ref 0–0.43)
INFLUENZA A BY PCR: NOT DETECTED
INFLUENZA A H1 (2009) PCR: NOT DETECTED
INFLUENZA A H1 PANDEMIC PCR: NOT DETECTED
INFLUENZA A H3 PCR: NOT DETECTED
INFLUENZA B BY PCR: NOT DETECTED
LACTATE: 0.6 MMOL/L (ref 0.4–2)
LYMPHOCYTES ABSOLUTE: 1.6 K/CU MM
LYMPHOCYTES RELATIVE PERCENT: 19.1 % (ref 24–44)
MCH RBC QN AUTO: 27.6 PG (ref 27–31)
MCHC RBC AUTO-ENTMCNC: 31.8 % (ref 32–36)
MCV RBC AUTO: 86.8 FL (ref 78–100)
MONOCYTES ABSOLUTE: 0.9 K/CU MM
MONOCYTES RELATIVE PERCENT: 10.8 % (ref 0–4)
MYCOPLASMA PNEUMONIAE PCR: NOT DETECTED
PARAINFLUENZA 1 PCR: NOT DETECTED
PARAINFLUENZA 2 PCR: NOT DETECTED
PARAINFLUENZA 3 PCR: NOT DETECTED
PARAINFLUENZA 4 PCR: NOT DETECTED
PDW BLD-RTO: 15.3 % (ref 11.7–14.9)
PLATELET # BLD: 208 K/CU MM (ref 140–440)
PMV BLD AUTO: 9.5 FL (ref 7.5–11.1)
POTASSIUM SERPL-SCNC: 4.5 MMOL/L (ref 3.5–5.1)
PRO-BNP: 405.7 PG/ML
RBC # BLD: 4.24 M/CU MM (ref 4.2–5.4)
RHINOVIRUS ENTEROVIRUS PCR: DETECTED
RSV PCR: DETECTED
SARS-COV-2: NOT DETECTED
SEGMENTED NEUTROPHILS ABSOLUTE COUNT: 5.4 K/CU MM
SEGMENTED NEUTROPHILS RELATIVE PERCENT: 66 % (ref 36–66)
SODIUM BLD-SCNC: 133 MMOL/L (ref 135–145)
TOTAL IMMATURE NEUTOROPHIL: 0.02 K/CU MM
TOTAL PROTEIN: 8.2 GM/DL (ref 6.4–8.2)
WBC # BLD: 8.2 K/CU MM (ref 4–10.5)

## 2021-09-16 PROCEDURE — 83880 ASSAY OF NATRIURETIC PEPTIDE: CPT

## 2021-09-16 PROCEDURE — 70450 CT HEAD/BRAIN W/O DYE: CPT

## 2021-09-16 PROCEDURE — 94760 N-INVAS EAR/PLS OXIMETRY 1: CPT

## 2021-09-16 PROCEDURE — 87040 BLOOD CULTURE FOR BACTERIA: CPT

## 2021-09-16 PROCEDURE — 96365 THER/PROPH/DIAG IV INF INIT: CPT

## 2021-09-16 PROCEDURE — 71045 X-RAY EXAM CHEST 1 VIEW: CPT

## 2021-09-16 PROCEDURE — 6360000002 HC RX W HCPCS: Performed by: EMERGENCY MEDICINE

## 2021-09-16 PROCEDURE — 85025 COMPLETE CBC W/AUTO DIFF WBC: CPT

## 2021-09-16 PROCEDURE — 83605 ASSAY OF LACTIC ACID: CPT

## 2021-09-16 PROCEDURE — 96367 TX/PROPH/DG ADDL SEQ IV INF: CPT

## 2021-09-16 PROCEDURE — 80053 COMPREHEN METABOLIC PANEL: CPT

## 2021-09-16 PROCEDURE — 0202U NFCT DS 22 TRGT SARS-COV-2: CPT

## 2021-09-16 PROCEDURE — 2580000003 HC RX 258: Performed by: EMERGENCY MEDICINE

## 2021-09-16 PROCEDURE — 99283 EMERGENCY DEPT VISIT LOW MDM: CPT

## 2021-09-16 PROCEDURE — 93005 ELECTROCARDIOGRAM TRACING: CPT | Performed by: EMERGENCY MEDICINE

## 2021-09-16 RX ORDER — SERTRALINE HYDROCHLORIDE 20 MG/ML
25 SOLUTION ORAL DAILY
COMMUNITY

## 2021-09-16 RX ADMIN — AZITHROMYCIN 500 MG: 500 INJECTION, POWDER, LYOPHILIZED, FOR SOLUTION INTRAVENOUS at 23:35

## 2021-09-16 RX ADMIN — CEFTRIAXONE SODIUM 1000 MG: 1 INJECTION, POWDER, FOR SOLUTION INTRAMUSCULAR; INTRAVENOUS at 22:31

## 2021-09-17 PROBLEM — B33.8 RSV (RESPIRATORY SYNCYTIAL VIRUS INFECTION): Status: ACTIVE | Noted: 2021-09-17

## 2021-09-17 PROBLEM — R09.02 HYPOXEMIA: Status: ACTIVE | Noted: 2021-09-17

## 2021-09-17 PROBLEM — N17.9 AKI (ACUTE KIDNEY INJURY) (HCC): Status: ACTIVE | Noted: 2021-09-17

## 2021-09-17 PROBLEM — J18.9 PNEUMONIA OF BOTH LOWER LOBES DUE TO INFECTIOUS ORGANISM: Status: ACTIVE | Noted: 2021-09-17

## 2021-09-17 LAB
EKG ATRIAL RATE: 68 BPM
EKG DIAGNOSIS: NORMAL
EKG P AXIS: 70 DEGREES
EKG P-R INTERVAL: 242 MS
EKG Q-T INTERVAL: 408 MS
EKG QRS DURATION: 132 MS
EKG QTC CALCULATION (BAZETT): 433 MS
EKG R AXIS: 13 DEGREES
EKG T AXIS: 116 DEGREES
EKG VENTRICULAR RATE: 68 BPM

## 2021-09-17 PROCEDURE — 94760 N-INVAS EAR/PLS OXIMETRY 1: CPT

## 2021-09-17 PROCEDURE — 1200000000 HC SEMI PRIVATE

## 2021-09-17 PROCEDURE — 6360000002 HC RX W HCPCS: Performed by: PHYSICIAN ASSISTANT

## 2021-09-17 PROCEDURE — 2580000003 HC RX 258: Performed by: PHYSICIAN ASSISTANT

## 2021-09-17 PROCEDURE — 97530 THERAPEUTIC ACTIVITIES: CPT

## 2021-09-17 PROCEDURE — 2700000000 HC OXYGEN THERAPY PER DAY

## 2021-09-17 PROCEDURE — 6360000002 HC RX W HCPCS: Performed by: EMERGENCY MEDICINE

## 2021-09-17 PROCEDURE — 6370000000 HC RX 637 (ALT 250 FOR IP): Performed by: PHYSICIAN ASSISTANT

## 2021-09-17 PROCEDURE — 94761 N-INVAS EAR/PLS OXIMETRY MLT: CPT

## 2021-09-17 PROCEDURE — 87086 URINE CULTURE/COLONY COUNT: CPT

## 2021-09-17 PROCEDURE — 93010 ELECTROCARDIOGRAM REPORT: CPT | Performed by: INTERNAL MEDICINE

## 2021-09-17 PROCEDURE — 94640 AIRWAY INHALATION TREATMENT: CPT

## 2021-09-17 PROCEDURE — 6370000000 HC RX 637 (ALT 250 FOR IP): Performed by: HOSPITALIST

## 2021-09-17 PROCEDURE — 94664 DEMO&/EVAL PT USE INHALER: CPT

## 2021-09-17 PROCEDURE — 97167 OT EVAL HIGH COMPLEX 60 MIN: CPT

## 2021-09-17 RX ORDER — SODIUM CHLORIDE 9 MG/ML
25 INJECTION, SOLUTION INTRAVENOUS PRN
Status: DISCONTINUED | OUTPATIENT
Start: 2021-09-17 | End: 2021-09-21 | Stop reason: HOSPADM

## 2021-09-17 RX ORDER — SERTRALINE HYDROCHLORIDE 20 MG/ML
25 SOLUTION ORAL DAILY
Status: DISCONTINUED | OUTPATIENT
Start: 2021-09-17 | End: 2021-09-17

## 2021-09-17 RX ORDER — FLECAINIDE ACETATE 50 MG/1
50 TABLET ORAL 2 TIMES DAILY
Status: DISCONTINUED | OUTPATIENT
Start: 2021-09-17 | End: 2021-09-21 | Stop reason: HOSPADM

## 2021-09-17 RX ORDER — ACETAMINOPHEN 325 MG/1
650 TABLET ORAL EVERY 6 HOURS PRN
Status: DISCONTINUED | OUTPATIENT
Start: 2021-09-17 | End: 2021-09-21 | Stop reason: HOSPADM

## 2021-09-17 RX ORDER — SERTRALINE HYDROCHLORIDE 25 MG/1
25 TABLET, FILM COATED ORAL DAILY
Status: DISCONTINUED | OUTPATIENT
Start: 2021-09-17 | End: 2021-09-21 | Stop reason: HOSPADM

## 2021-09-17 RX ORDER — LISINOPRIL 20 MG/1
20 TABLET ORAL DAILY
Status: DISCONTINUED | OUTPATIENT
Start: 2021-09-17 | End: 2021-09-21 | Stop reason: HOSPADM

## 2021-09-17 RX ORDER — ACETAMINOPHEN 650 MG/1
650 SUPPOSITORY RECTAL EVERY 6 HOURS PRN
Status: DISCONTINUED | OUTPATIENT
Start: 2021-09-17 | End: 2021-09-21 | Stop reason: HOSPADM

## 2021-09-17 RX ORDER — POLYETHYLENE GLYCOL 3350 17 G/17G
17 POWDER, FOR SOLUTION ORAL DAILY PRN
Status: DISCONTINUED | OUTPATIENT
Start: 2021-09-17 | End: 2021-09-21 | Stop reason: HOSPADM

## 2021-09-17 RX ORDER — AZITHROMYCIN 250 MG/1
500 TABLET, FILM COATED ORAL EVERY 24 HOURS
Status: DISPENSED | OUTPATIENT
Start: 2021-09-17 | End: 2021-09-20

## 2021-09-17 RX ORDER — SODIUM CHLORIDE 9 MG/ML
INJECTION, SOLUTION INTRAVENOUS CONTINUOUS
Status: DISCONTINUED | OUTPATIENT
Start: 2021-09-17 | End: 2021-09-18

## 2021-09-17 RX ORDER — ONDANSETRON 4 MG/1
4 TABLET, ORALLY DISINTEGRATING ORAL EVERY 8 HOURS PRN
Status: DISCONTINUED | OUTPATIENT
Start: 2021-09-17 | End: 2021-09-21 | Stop reason: HOSPADM

## 2021-09-17 RX ORDER — LANOLIN ALCOHOL/MO/W.PET/CERES
3 CREAM (GRAM) TOPICAL NIGHTLY PRN
Status: DISCONTINUED | OUTPATIENT
Start: 2021-09-17 | End: 2021-09-21 | Stop reason: HOSPADM

## 2021-09-17 RX ORDER — GUAIFENESIN 600 MG/1
600 TABLET, EXTENDED RELEASE ORAL 2 TIMES DAILY
Status: DISCONTINUED | OUTPATIENT
Start: 2021-09-17 | End: 2021-09-21 | Stop reason: HOSPADM

## 2021-09-17 RX ORDER — ONDANSETRON 2 MG/ML
4 INJECTION INTRAMUSCULAR; INTRAVENOUS EVERY 6 HOURS PRN
Status: DISCONTINUED | OUTPATIENT
Start: 2021-09-17 | End: 2021-09-21 | Stop reason: HOSPADM

## 2021-09-17 RX ORDER — ALBUTEROL SULFATE 2.5 MG/3ML
2.5 SOLUTION RESPIRATORY (INHALATION) EVERY 4 HOURS PRN
Status: DISCONTINUED | OUTPATIENT
Start: 2021-09-17 | End: 2021-09-21 | Stop reason: HOSPADM

## 2021-09-17 RX ORDER — ALBUTEROL SULFATE 2.5 MG/3ML
2.5 SOLUTION RESPIRATORY (INHALATION)
Status: DISCONTINUED | OUTPATIENT
Start: 2021-09-17 | End: 2021-09-17

## 2021-09-17 RX ORDER — SODIUM CHLORIDE 0.9 % (FLUSH) 0.9 %
5-40 SYRINGE (ML) INJECTION PRN
Status: DISCONTINUED | OUTPATIENT
Start: 2021-09-17 | End: 2021-09-21 | Stop reason: HOSPADM

## 2021-09-17 RX ORDER — M-VIT,TX,IRON,MINS/CALC/FOLIC 27MG-0.4MG
1 TABLET ORAL DAILY
Status: DISCONTINUED | OUTPATIENT
Start: 2021-09-17 | End: 2021-09-21 | Stop reason: HOSPADM

## 2021-09-17 RX ORDER — ALBUTEROL SULFATE 2.5 MG/3ML
2.5 SOLUTION RESPIRATORY (INHALATION) ONCE
Status: COMPLETED | OUTPATIENT
Start: 2021-09-17 | End: 2021-09-17

## 2021-09-17 RX ORDER — IPRATROPIUM BROMIDE AND ALBUTEROL SULFATE 2.5; .5 MG/3ML; MG/3ML
1 SOLUTION RESPIRATORY (INHALATION)
Status: DISCONTINUED | OUTPATIENT
Start: 2021-09-17 | End: 2021-09-21 | Stop reason: HOSPADM

## 2021-09-17 RX ORDER — SODIUM CHLORIDE 0.9 % (FLUSH) 0.9 %
5-40 SYRINGE (ML) INJECTION EVERY 12 HOURS SCHEDULED
Status: DISCONTINUED | OUTPATIENT
Start: 2021-09-17 | End: 2021-09-21 | Stop reason: HOSPADM

## 2021-09-17 RX ORDER — METOPROLOL SUCCINATE 25 MG/1
25 TABLET, EXTENDED RELEASE ORAL DAILY
Status: DISCONTINUED | OUTPATIENT
Start: 2021-09-17 | End: 2021-09-21 | Stop reason: HOSPADM

## 2021-09-17 RX ADMIN — MULTIPLE VITAMINS W/ MINERALS TAB 1 TABLET: TAB at 10:37

## 2021-09-17 RX ADMIN — ACETAMINOPHEN 650 MG: 325 TABLET ORAL at 18:44

## 2021-09-17 RX ADMIN — GUAIFENESIN 600 MG: 600 TABLET, EXTENDED RELEASE ORAL at 13:54

## 2021-09-17 RX ADMIN — FLECAINIDE ACETATE 50 MG: 50 TABLET ORAL at 20:50

## 2021-09-17 RX ADMIN — FLECAINIDE ACETATE 50 MG: 50 TABLET ORAL at 10:37

## 2021-09-17 RX ADMIN — IPRATROPIUM BROMIDE AND ALBUTEROL SULFATE 1 AMPULE: .5; 3 SOLUTION RESPIRATORY (INHALATION) at 07:20

## 2021-09-17 RX ADMIN — GUAIFENESIN 600 MG: 600 TABLET, EXTENDED RELEASE ORAL at 20:50

## 2021-09-17 RX ADMIN — ENOXAPARIN SODIUM 40 MG: 40 INJECTION SUBCUTANEOUS at 10:38

## 2021-09-17 RX ADMIN — METOPROLOL SUCCINATE 25 MG: 25 TABLET, EXTENDED RELEASE ORAL at 10:37

## 2021-09-17 RX ADMIN — SODIUM CHLORIDE: 9 INJECTION, SOLUTION INTRAVENOUS at 13:54

## 2021-09-17 RX ADMIN — SODIUM CHLORIDE: 9 INJECTION, SOLUTION INTRAVENOUS at 03:08

## 2021-09-17 RX ADMIN — LISINOPRIL 20 MG: 20 TABLET ORAL at 10:37

## 2021-09-17 RX ADMIN — IPRATROPIUM BROMIDE AND ALBUTEROL SULFATE 1 AMPULE: .5; 3 SOLUTION RESPIRATORY (INHALATION) at 11:30

## 2021-09-17 RX ADMIN — IPRATROPIUM BROMIDE AND ALBUTEROL SULFATE 1 AMPULE: .5; 3 SOLUTION RESPIRATORY (INHALATION) at 19:44

## 2021-09-17 RX ADMIN — SERTRALINE 25 MG: 25 TABLET, FILM COATED ORAL at 10:40

## 2021-09-17 RX ADMIN — ALBUTEROL SULFATE 2.5 MG: 2.5 SOLUTION RESPIRATORY (INHALATION) at 01:28

## 2021-09-17 ASSESSMENT — ENCOUNTER SYMPTOMS
VOMITING: 0
ABDOMINAL PAIN: 0
COUGH: 1
SHORTNESS OF BREATH: 1
SORE THROAT: 0
CONSTIPATION: 0
NAUSEA: 0
RHINORRHEA: 1
DIARRHEA: 0
EYE PAIN: 0
BACK PAIN: 0

## 2021-09-17 ASSESSMENT — PAIN SCALES - GENERAL
PAINLEVEL_OUTOF10: 3
PAINLEVEL_OUTOF10: 0
PAINLEVEL_OUTOF10: 0

## 2021-09-17 ASSESSMENT — PAIN DESCRIPTION - PAIN TYPE: TYPE: ACUTE PAIN

## 2021-09-17 ASSESSMENT — PAIN DESCRIPTION - ONSET: ONSET: GRADUAL

## 2021-09-17 ASSESSMENT — PAIN DESCRIPTION - PROGRESSION: CLINICAL_PROGRESSION: GRADUALLY WORSENING

## 2021-09-17 ASSESSMENT — PAIN DESCRIPTION - FREQUENCY: FREQUENCY: INTERMITTENT

## 2021-09-17 ASSESSMENT — PAIN DESCRIPTION - LOCATION: LOCATION: HEAD

## 2021-09-17 ASSESSMENT — PAIN DESCRIPTION - DESCRIPTORS: DESCRIPTORS: HEADACHE

## 2021-09-17 NOTE — ED PROVIDER NOTES
Triage Chief Complaint:   Cough (Cold like symptoms, ) and Altered Mental Status (Hallucinations talking to people who aren't there, Dx with UTI by PCP and started on bactrim and getting worse. )    CELINA Forrester is a 80 y.o. female that presents with worsening cough, shortness of breath and hallucinations. History is obtained from patient's family member who is present and they report that patient was recently placed on an antibiotic for some confusion and urinary tract infection. Patient has been taking Bactrim for the last 3 days with no improvement and only worsening mental status. Family member reports that she has now developed a cough and some noisy breathing. Cough has been nonproductive so far. Patient has also developed increasing hallucinations. Daughter reports that patient is talking to people that are not there. Family member reports the patient does not routinely get this severe at all with just urinary tract infections and normally she bounces back quite quickly. Patient is normally very active and walks over a mile every day, which she is not able to do currently. Patient denies any pain of any kind. No COVID-19 exposure. Patient unfortunately is not vaccinated to COVID-19. No known pulmonary disease. No smoking history. Patient is very hard of hearing which does limit history.       ROS:  General:  No fevers, no chills, no weakness  Eyes:  No recent vison changes, no discharge  ENT:  No sore throat, no nasal congestion, no hearing changes  Cardiovascular:  No chest pain, no palpitations  Respiratory:  No shortness of breath, + cough, + noisy breathing  Gastrointestinal:  No pain, no nausea, no vomiting, no diarrhea  Musculoskeletal:  No muscle pain, no joint pain  Skin:  No rash, no pruritis, no easy bruising  Neurologic:  No speech problems, no headache, no extremity numbness, no extremity tingling, no extremity weakness  Psychiatric:  No anxiety  Genitourinary: No dysuria, no hematuria  Endocrine:  No unexpected weight gain, no unexpected weight loss  Extremities:  no edema, no pain    Past Medical History:   Diagnosis Date    Bradycardia 5/22/2017    Glaucoma     H/O cardiovascular stress test 4/14/2011    normal pattern of perfusion in all regions, ef 70    H/O echocardiogram 4/14/2011    mild concentric lvh with preserved systolic and abnormal diastolic fxn ND>16    Robinson (hard of hearing) 10/22/2014    Hypertension     PAF (paroxysmal atrial fibrillation) (HCC)     resolved on Flecainide    Risk for falls 5/22/2017    Thyroid disease      Past Surgical History:   Procedure Laterality Date    HYSTERECTOMY       Family History   Problem Relation Age of Onset    Kidney Disease Mother     Stroke Father     Heart Disease Sister     High Blood Pressure Sister     Heart Disease Brother     High Blood Pressure Brother     Cancer Brother     Cancer Sister     Cancer Sister      Social History     Socioeconomic History    Marital status:      Spouse name: Not on file    Number of children: 0    Years of education: Not on file    Highest education level: Not on file   Occupational History    Occupation: volunteer   Tobacco Use    Smoking status: Never Smoker    Smokeless tobacco: Never Used   Vaping Use    Vaping Use: Never used   Substance and Sexual Activity    Alcohol use: No     Comment: Tea daily    Drug use: No    Sexual activity: Not on file   Other Topics Concern    Not on file   Social History Narrative    Not on file     Social Determinants of Health     Financial Resource Strain:     Difficulty of Paying Living Expenses:    Food Insecurity:     Worried About [address] in the Last Year:     [facility] in the Last Year:    Transportation Needs:     Lack of Transportation (Medical):      Lack of Transportation (Non-Medical):    Physical Activity:     Days of Exercise per Week:     Minutes of Exercise per Session: Stress:     Feeling of Stress :    Social Connections:     Frequency of Communication with Friends and Family:     Frequency of Social Gatherings with Friends and Family:     Attends [employer] Services:     Active Member of Clubs or Organizations:     Attends Club or Organization Meetings:     Marital Status:    Intimate Partner Violence:     Fear of Current or Ex-Partner:     Emotionally Abused:     Physically Abused:     Sexually Abused:      No current facility-administered medications for this encounter. Current Outpatient Medications   Medication Sig Dispense Refill    sertraline (ZOLOFT) 20 MG/ML concentrated solution Take 25 mg by mouth daily      flecainide (TAMBOCOR) 50 MG tablet Take 1 tablet by mouth 2 times daily 180 tablet 3    lisinopril (PRINIVIL;ZESTRIL) 20 MG tablet TAKE ONE TABLET BY MOUTH DAILY 90 tablet 2    metoprolol succinate (TOPROL XL) 25 MG extended release tablet TAKE ONE TABLET BY MOUTH DAILY 90 tablet 2    multivitamin (THERAGRAN) per tablet Take 1 tablet by mouth daily. No Known Allergies    Nursing Notes Reviewed    Physical Exam:  ED Triage Vitals [09/16/21 2021]   Enc Vitals Group      BP (!) 147/63      Pulse 66      Resp 19      Temp 99 °F (37.2 °C)      Temp Source Oral      SpO2 (!) 89 %      Weight 132 lb (59.9 kg)      Height 4' 7\" (1.397 m)      Head Circumference       Peak Flow       Pain Score       Pain Loc       Pain Edu? Excl. in [address]? My pulse ox interpretation is - normal    General appearance:  No acute distress. Appears well for stated age. Skin:  Warm. Dry. No diaphoresis. Eye:  Extraocular movements intact. Ears, nose, mouth and throat:  Oral mucosa moist   Neck:  Trachea midline. Extremity:  No swelling. Normal ROM     Heart:  Regular rate and rhythm, normal S1 & S2, no extra heart sounds. Perfusion:  intact  Respiratory:  Lungs clear to auscultation bilaterally. Respirations nonlabored.      Abdominal: Normal bowel sounds. Soft. Nontender. Non distended. Back:  No CVA tenderness to palpation     Neurological:  Alert and oriented times 3. No focal neuro deficits.              Psychiatric:  Appropriate    I have reviewed and interpreted all of the currently available lab results from this visit (if applicable):  Results for orders placed or performed during the hospital encounter of 09/16/21   Respiratory Panel, Molecular, with COVID-19 (Restricted: peds pts or suitable admitted adults)    Specimen: Nasopharyngeal   Result Value Ref Range    Adenovirus Detection by PCR NOT DETECTED NOT DETECTED    Coronavirus 229E PCR NOT DETECTED NOT DETECTED    Coronavirus HKU1 PCR NOT DETECTED NOT DETECTED    Coronavirus NL63 PCR NOT DETECTED NOT DETECTED    Coronavirus OC43 PCR NOT DETECTED NOT DETECTED    SARS-CoV-2 NOT DETECTED NOT DETECTED    Human Metapneumovirus PCR NOT DETECTED NOT DETECTED    Rhinovirus Enterovirus PCR DETECTED (A) NOT DETECTED    Influenza A by PCR NOT DETECTED NOT DETECTED    Influenza A H1 Pandemic PCR NOT DETECTED NOT DETECTED    Influenza A H1 (2009) PCR NOT DETECTED NOT DETECTED    Influenza A H3 PCR NOT DETECTED NOT DETECTED    Influenza B by PCR NOT DETECTED NOT DETECTED    Parainfluenza 1 PCR NOT DETECTED NOT DETECTED    Parainfluenza 2 PCR NOT DETECTED NOT DETECTED    Parainfluenza 3 PCR NOT DETECTED NOT DETECTED    Parainfluenza 4 PCR NOT DETECTED NOT DETECTED    RSV PCR DETECTED (A) NOT DETECTED    Bordetella parapertussis by PCR NOT DETECTED NOT DETECTED    B Pertussis by PCR NOT DETECTED NOT DETECTED    Chlamydophila Pneumonia PCR NOT DETECTED NOT DETECTED    Mycoplasma pneumo by PCR NOT DETECTED NOT DETECTED   CBC auto diff   Result Value Ref Range    WBC 8.2 4.0 - 10.5 K/CU MM    RBC 4.24 4.2 - 5.4 M/CU MM    Hemoglobin 11.7 (L) 12.5 - 16.0 GM/DL    Hematocrit 36.8 (L) 37 - 47 %    MCV 86.8 78 - 100 FL    MCH 27.6 27 - 31 PG    MCHC 31.8 (L) 32.0 - 36.0 %    RDW 15.3 (H) 11.7 - 14.9 % Platelets 866 514 - 222 K/CU MM    MPV 9.5 7.5 - 11.1 FL    Differential Type AUTOMATED DIFFERENTIAL     Segs Relative 66.0 36 - 66 %    Lymphocytes % 19.1 (L) 24 - 44 %    Monocytes % 10.8 (H) 0 - 4 %    Eosinophils % 3.2 (H) 0 - 3 %    Basophils % 0.7 0 - 1 %    Segs Absolute 5.4 K/CU MM    Lymphocytes Absolute 1.6 K/CU MM    Monocytes Absolute 0.9 K/CU MM    Eosinophils Absolute 0.3 K/CU MM    Basophils Absolute 0.1 K/CU MM    Immature Neutrophil % 0.2 0 - 0.43 %    Total Immature Neutrophil 0.02 K/CU MM   Comprehensive Metabolic Panel w/ Reflex to MG   Result Value Ref Range    Sodium 133 (L) 135 - 145 MMOL/L    Potassium 4.5 3.5 - 5.1 MMOL/L    Chloride 97 (L) 99 - 110 mMol/L    CO2 24 21 - 32 MMOL/L    BUN 38 (H) 6 - 23 MG/DL    CREATININE 1.6 (H) 0.6 - 1.1 MG/DL    Glucose 91 70 - 99 MG/DL    Calcium 9.2 8.3 - 10.6 MG/DL    Albumin 3.6 3.4 - 5.0 GM/DL    Total Protein 8.2 6.4 - 8.2 GM/DL    Total Bilirubin 0.3 0.0 - 1.0 MG/DL    ALT 13 10 - 40 U/L    AST 33 15 - 37 IU/L    Alkaline Phosphatase 69 40 - 129 IU/L    GFR Non-African American 30 (L) >60 mL/min/1.73m2    GFR  36 (L) >60 mL/min/1.73m2    Anion Gap 12 4 - 16   Lactate, Plasma   Result Value Ref Range    Lactate 0.6 0.4 - 2.0 mMOL/L   Brain Natriuretic Peptide   Result Value Ref Range    Pro-.7 (H) <300 PG/ML   EKG 12 Lead   Result Value Ref Range    Ventricular Rate 68 BPM    Atrial Rate 68 BPM    P-R Interval 242 ms    QRS Duration 132 ms    Q-T Interval 408 ms    QTc Calculation (Bazett) 433 ms    P Axis 70 degrees    R Axis 13 degrees    T Axis 116 degrees    Diagnosis       Sinus rhythm with 1st degree AV block  Left ventricular hypertrophy with QRS widening and repolarization abnormality  Cannot rule out Septal infarct , age undetermined  Abnormal ECG  When compared with ECG of 15-MAY-2021 12:22,  Minimal criteria for Septal infarct are now present  T wave inversion no longer evident in Inferior leads  T wave amplitude has increased in Anterior leads  T wave inversion more evident in Lateral leads        Radiographs (if obtained):  [] The following radiograph was interpreted by myself in the absence of a radiologist:   [x] Radiologist's Report Reviewed:  CT HEAD WO CONTRAST   Final Result   No acute intracranial abnormality. No acute territorial infarction,   intracranial hemorrhage or intraparenchymal mass lesion. 15 mm extra-axial ossification is arising from the inner table of left   frontal bone with no significant mass effect on adjacent brain. Differentials   include focal hyperostosis frontalis or osteoma. Calcified meningioma is less   likely. XR CHEST PORTABLE   Final Result   1. Question mild bibasilar opacities superimposed on a background of chronic   underlying lung changes. Findings could reflect atelectasis versus   infiltrate. EKG (if obtained): (All EKG's are interpreted by myself in the absence of a cardiologist)  12 lead EKG per my interpretation:  Normal Sinus Rhythm 1st degree AV block at 68  Axis is   Normal  QTc is  within an acceptable range  There is no specific T wave changes appreciated. There is no specific ST wave changes appreciated. No STEMI    Prior EKG to compare with was available and no clinically significant change in overall morphology. Chart review shows recent radiographs:  No results found. MDM:  Pt presents as above. Emergent conditions considered. Presentation prompted initial labs, EKG and imaging. Patient is placed on supplemental oxygen for oxygen saturation of 89%. Chest x-ray demonstrating mild bibasilar opacities which given patient's clinical picture does fit with a pneumonia. CT head is negative for acute intracranial abnormality. BNP is not suggestive of significant volume overload. Lactic acid is not suggestive of significant tissue hypoperfusion or severe sepsis.   CMP is with mild hyponatremia and mild acute on chronic renal insufficiency. CBC is without clinically significant derangement other than mild chronic anemia. No leukocytosis. Patient is started on Rocephin and azithromycin IV for likely community-acquired pneumonia given the above. Respiratory panel is sent and is positive for RSV and rhinovirus. Patient does have new oxygen requirement as above and does require admission to the hospital for the same. Patient does not meet sepsis criteria which is reassuring however given her age she clearly needs to be monitored closely. Patient is discussed with hospitalist team and DOUGLAS Fuller, will admit the patient. I discussed specific signs and symptoms on when to return to the emergency department as well as the need for close outpatient follow-up. Questions sought and answered with the patient and family member. They voice understanding and agree with plan. Clinical Impression:  1. Community acquired pneumonia, unspecified laterality    2. Delirium    3. KISHA (acute kidney injury) ([facility] [address]    4. Acute respiratory failure with hypoxemia (HCC)    5. Rhinovirus    6. Respiratory syncytial virus (RSV)      Disposition referral (if applicable):  No follow-up provider specified. Disposition medications (if applicable):  New Prescriptions    No medications on file       Comment: Please note this report has been produced using speech recognition software and may contain errors related to that system including errors in grammar, punctuation, and spelling, as well as words and phrases that may be inappropriate. If there are any questions or concerns please feel free to contact the dictating provider for clarification.        Analy Serrano MD  09/17/21 [id number]

## 2021-09-17 NOTE — CARE COORDINATION
CM spoke with the patient's niece Cynthia Gross ([phone]) for discharge planning. Patient lives with her niece Tomas Johnson and [address] , has insurance with Rx coverage & PCP, and required assistance with ADL's prior to admission. Patient uses a walker when outside the home and a cane inside the home and does not require home oxygen. Patient's niece assists with bathing, dressing, and meal prep. Tomas Johnson stated that she is a nurse at [employer] and works 40 hours every 2 weeks. Odalis's  is retired and helps patient during the day when Tomas Johnson is working. Tomas Johnson stated that the patient is normally very active and walks as much as a mile daily but has a history of developing UTI's a couple times a year. Tomas Johnson stated that she usually has only minor disorientation with UTI's and has never exhibited hallucinations and confusion like she is currently experiencing. Tomas Johnson stated that she hopes the patient's confusion will clear with treatment and if so she would would be interested in swing bed placement for short-term skilled therapy as she is also exhibiting weakness. Tomas Johnson stated that if the patient's confusion does not clear than she will not be able to return home and will need placement in a traditional nursing facility. CM will follow.

## 2021-09-17 NOTE — PLAN OF CARE
Problem: Falls - Risk of:  Goal: Will remain free from falls  Description: Will remain free from falls  9/17/2021 1648 by María Orellana  Outcome: Ongoing  9/17/2021 0256 by Afshan Dominguez RN  Outcome: Ongoing  Goal: Absence of physical injury  Description: Absence of physical injury  9/17/2021 1648 by María Orellana  Outcome: Ongoing  9/17/2021 0256 by Afshan Dominguez RN  Outcome: Ongoing     Problem: Discharge Planning:  Goal: Discharged to appropriate level of care  Description: Discharged to appropriate level of care  9/17/2021 1648 by María Orellana  Outcome: Ongoing  9/17/2021 0256 by Afshan Dominguez RN  Outcome: Ongoing  Goal: Participates in care planning  Description: Participates in care planning  9/17/2021 1648 by María Orellana  Outcome: Ongoing  9/17/2021 0256 by Afshan Dominguez RN  Outcome: Ongoing     Problem: Airway Clearance - Ineffective:  Goal: Clear lung sounds  Description: Clear lung sounds  9/17/2021 1648 by María Orellana  Outcome: Ongoing  9/17/2021 0256 by Afshan Dominguez RN  Outcome: Ongoing  Goal: Ability to maintain a clear airway will improve  Description: Ability to maintain a clear airway will improve  9/17/2021 1648 by María Orellana  Outcome: Ongoing  9/17/2021 0256 by Afshan Dominguez RN  Outcome: Ongoing     Problem: Fluid Volume - Deficit:  Goal: Achieves intake and output within specified parameters  Description: Achieves intake and output within specified parameters  9/17/2021 1648 by María Orellana  Outcome: Ongoing  9/17/2021 0256 by Afshan Dominguez RN  Outcome: Ongoing     Problem: Gas Exchange - Impaired:  Goal: Levels of oxygenation will improve  Description: Levels of oxygenation will improve  9/17/2021 1648 by María Orellana  Outcome: Ongoing  9/17/2021 0256 by Afshan Dominguez RN  Outcome: Ongoing     Problem: Hyperthermia:  Goal: Ability to maintain a body temperature in the normal range will improve  Description: Ability to maintain a body temperature in the normal range will improve  9/17/2021 1648 by Reymundo Murray  Outcome: Ongoing  9/17/2021 0256 by Abel Ureña RN  Outcome: Ongoing

## 2021-09-17 NOTE — H&P
HISTORY AND PHYSICAL             Name:  David Cruz /Age/Sex: 1919  (80 y.o. female)   MRN & CSN:  [id number] & [id number] Admission Date/Time: 2021  8:28 PM   Location:   PCP: René Harrison MD         Chief Complaint     Chief Complaint   Patient presents with    Cough     Cold like symptoms,     Altered Mental Status     Hallucinations talking to people who aren't there, Dx with UTI by PCP and started on bactrim and getting worse. History Obtained From   patient, family member - Neice    History of Present Illness (4 elements)   David Cruz is a 80 y. o. who presents to the hospital with a complaint of cough, runny nose, and some very minimal shortness of breath. This is been going on for the last 24 hours. The niece became concerned after the patient started acting a little funny last night, she was brought here to the emergency department, found to have RSV, rhinovirus, and a slight KISHA. Incidental finding of equivocal pneumonia. Patient is hard of hearing, and is not entirely helpful, history mostly comes from the knees. She does deny any chest pain, abdominal pain, nausea or vomiting. Past Medical History     Past Medical History:   Diagnosis Date    Bradycardia 2017    Glaucoma     H/O cardiovascular stress test 2011    normal pattern of perfusion in all regions, ef 70    H/O echocardiogram 2011    mild concentric lvh with preserved systolic and abnormal diastolic fxn BW>57    [place] (hard of hearing) 10/22/2014    Hypertension     PAF (paroxysmal atrial fibrillation) ([facility] [address]     resolved on Flecainide    Risk for falls 2017    Thyroid disease         Past Surgical History     Past Surgical History:   Procedure Laterality Date    HYSTERECTOMY          Medications Prior to Admission     Prior to Admission medications    Medication Sig Start Date End Date Taking?  Authorizing Provider   sertraline (ZOLOFT) 20 MG/ML unless as noted above  Physical Exam (need 10 systems)   /66   Pulse 68   Temp 98.3 °F (36.8 °C) (Oral)   Resp 14   Ht 4' 7\" (1.397 m)   Wt 128 lb 4.8 oz (58.2 kg)   SpO2 95%   BMI 29.82 kg/m²   Physical Exam  Vitals and nursing note reviewed. Constitutional:       Appearance: Normal appearance. She is well-developed. She is not ill-appearing or diaphoretic. HENT:      Head: Normocephalic and atraumatic. Right Ear: External ear normal.      Left Ear: External ear normal.      Nose: Nose normal.   Eyes:      General:         Right eye: No discharge. Left eye: No discharge. Cardiovascular:      Rate and Rhythm: Normal rate and regular rhythm. Heart sounds: Normal heart sounds. No murmur heard. No friction rub. No gallop. Pulmonary:      Effort: Pulmonary effort is normal. No respiratory distress. Breath sounds: Normal breath sounds. No stridor. No wheezing or rales. Chest:      Chest wall: No tenderness. Abdominal:      General: Abdomen is flat. There is no distension. Tenderness: There is no abdominal tenderness. There is no guarding. Musculoskeletal:         General: Normal range of motion. Cervical back: Normal range of motion and neck supple. Right lower leg: No edema. Left lower leg: No edema. Skin:     General: Skin is warm and dry. Coloration: Skin is not pale. Neurological:      General: No focal deficit present. Mental Status: She is alert and oriented to person, place, and time.    Psychiatric:         Mood and Affect: Mood normal.         Behavior: Behavior normal.            Labs      Recent Results (from the past 24 hour(s))   EKG 12 Lead    Collection Time: 09/16/21  9:21 PM   Result Value Ref Range    Ventricular Rate 68 BPM    Atrial Rate 68 BPM    P-R Interval 242 ms    QRS Duration 132 ms    Q-T Interval 408 ms    QTc Calculation (Bazett) 433 ms    P Axis 70 degrees    R Axis 13 degrees    T Axis 116 degrees Diagnosis       Sinus rhythm with 1st degree AV block  Left ventricular hypertrophy with QRS widening and repolarization abnormality  Cannot rule out Septal infarct , age undetermined  Abnormal ECG  When compared with ECG of 15-MAY-2021 12:22,  Minimal criteria for Septal infarct are now present  T wave inversion no longer evident in Inferior leads  T wave amplitude has increased in Anterior leads  T wave inversion more evident in Lateral leads     CBC auto diff    Collection Time: 09/16/21  9:50 PM   Result Value Ref Range    WBC 8.2 4.0 - 10.5 K/CU MM    RBC 4.24 4.2 - 5.4 M/CU MM    Hemoglobin 11.7 (L) 12.5 - 16.0 GM/DL    Hematocrit 36.8 (L) 37 - 47 %    MCV 86.8 78 - 100 FL    MCH 27.6 27 - 31 PG    MCHC 31.8 (L) 32.0 - 36.0 %    RDW 15.3 (H) 11.7 - 14.9 %    Platelets 761 721 - 596 K/CU MM    MPV 9.5 7.5 - 11.1 FL    Differential Type AUTOMATED DIFFERENTIAL     Segs Relative 66.0 36 - 66 %    Lymphocytes % 19.1 (L) 24 - 44 %    Monocytes % 10.8 (H) 0 - 4 %    Eosinophils % 3.2 (H) 0 - 3 %    Basophils % 0.7 0 - 1 %    Segs Absolute 5.4 K/CU MM    Lymphocytes Absolute 1.6 K/CU MM    Monocytes Absolute 0.9 K/CU MM    Eosinophils Absolute 0.3 K/CU MM    Basophils Absolute 0.1 K/CU MM    Immature Neutrophil % 0.2 0 - 0.43 %    Total Immature Neutrophil 0.02 K/CU MM   Comprehensive Metabolic Panel w/ Reflex to MG    Collection Time: 09/16/21  9:50 PM   Result Value Ref Range    Sodium 133 (L) 135 - 145 MMOL/L    Potassium 4.5 3.5 - 5.1 MMOL/L    Chloride 97 (L) 99 - 110 mMol/L    CO2 24 21 - 32 MMOL/L    BUN 38 (H) 6 - 23 MG/DL    CREATININE 1.6 (H) 0.6 - 1.1 MG/DL    Glucose 91 70 - 99 MG/DL    Calcium 9.2 8.3 - 10.6 MG/DL    Albumin 3.6 3.4 - 5.0 GM/DL    Total Protein 8.2 6.4 - 8.2 GM/DL    Total Bilirubin 0.3 0.0 - 1.0 MG/DL    ALT 13 10 - 40 U/L    AST 33 15 - 37 IU/L    Alkaline Phosphatase 69 40 - 129 IU/L    GFR Non-African American 30 (L) >60 mL/min/1.73m2    GFR  36 (L) >60 mL/min/1.73m2 as reasonably achievable. COMPARISON: None. HISTORY: ORDERING SYSTEM PROVIDED HISTORY: AMS, hallucinations TECHNOLOGIST PROVIDED HISTORY: Reason for exam:->AMS, hallucinations Has a \"code stroke\" or \"stroke alert\" been called? ->No Decision Support Exception - unselect if not a suspected or confirmed emergency medical condition->Emergency Medical Condition (MA) Reason for Exam: cough, AMS Acuity: Acute Type of Exam: Initial Additional signs and symptoms: cough, AMS Relevant Medical/Surgical History: cough, AMS FINDINGS: BRAIN/VENTRICLES: There is no acute intracranial hemorrhage, mass effect or midline shift. No abnormal extra-axial fluid collection. The gray-white differentiation is maintained without evidence of an acute infarct. There is no evidence of hydrocephalus. 15 mm extra-axial ossification is arising from the inner table of left frontal bone with no significant mass effect on adjacent brain. Differentials include focal hyperostosis frontalis or osteoma. Calcified meningioma is less likely. ORBITS: The visualized portion of the orbits demonstrate no acute abnormality. SINUSES: Severe mucosal thickening of the ethmoidal air cells. Mild mucosal thickening of the frontal maxillary and left sphenoid sinus. The visualized paranasal sinuses and mastoid air cells demonstrate no other abnormality. SOFT TISSUES/SKULL:  No acute abnormality of the visualized skull or soft tissues. No acute intracranial abnormality. No acute territorial infarction, intracranial hemorrhage or intraparenchymal mass lesion. 15 mm extra-axial ossification is arising from the inner table of left frontal bone with no significant mass effect on adjacent brain. Differentials include focal hyperostosis frontalis or osteoma. Calcified meningioma is less likely.      XR CHEST PORTABLE    Result Date: 9/16/2021  EXAMINATION: ONE XRAY VIEW OF THE CHEST 9/16/2021 9:16 pm COMPARISON: Chest x-ray May 15, 2021; chest x-ray December 24, 2018 medications from home  6. Paroxysmal atrial fibrillation: Cardiac monitoring and Lovenox while in the hospital  7. Hard of hearing: Care will be taken to communicate with the patient  8. Diet ADULT DIET;  Regular   DVT Prophylaxis [x] Lovenox, []  Heparin, [] SCDs, [] Ambulation   GI Prophylaxis [] PPI,  [] H2 Blocker,  [] Carafate,  [] Diet/Tube Feeds   Code Status Full Code   Surrogate Decision Maker  patient's niece   Disposition Patient requires continued admission due to hypoxemia pneumonia         Consultations Ordered:  IP CONSULT TO HOSPITALIST    Medications   Medications:    Infusions:   PRN Meds:         Electronically signed by Markel Vines PA-C on 9/17/21 at

## 2021-09-17 NOTE — ED TRIAGE NOTES
Pt from home family states seen and treated for UTI, taking Bactrim but thinks it is getting worse. Hallucinations, seeing people who aren't really there and talking to them. Cough/cold like symptoms.

## 2021-09-17 NOTE — PROGRESS NOTES
Medications verified with the patient's niece by the ED nurse Kelly Ribeiro, the patient does not know her medications. Skin assessment performed by [facility] RN and myself, no abnormalities were noted.

## 2021-09-17 NOTE — PROGRESS NOTES
scattered course rhonchi (partially cleared with cough), moist cough  Abdomen Soft NT ND NABS  Ext: no ankle edema  Skin: no rash  Psych: visual hallucinations      Results:     Lab Results   Component Value Date    WBC 8.2 09/16/2021    HGB 11.7 (L) 09/16/2021    HCT 36.8 (L) 09/16/2021    MCV 86.8 09/16/2021     09/16/2021       Lab Results   Component Value Date     09/16/2021    K 4.5 09/16/2021    CL 97 09/16/2021    CO2 24 09/16/2021    BUN 38 09/16/2021    CREATININE 1.6 09/16/2021    GLUCOSE 91 09/16/2021    CALCIUM 9.2 09/16/2021        Assessment and Plan: Active Hospital Problems    Diagnosis Date Noted    Pneumonia of both lower lobes due to infectious organism [J18.9] 09/17/2021     Priority: High    Hypoxemia [R09.02] 09/17/2021     Priority: High    KISHA (acute kidney injury) ([facility] [address] [N17.9] 09/17/2021     Priority: High    RSV (respiratory syncytial virus infection) [B97.4] 09/17/2021     Priority: Medium    [place] (hard of hearing) [H91.90] 10/22/2014    Hypertension [I10]     PAF (paroxysmal atrial fibrillation) (HCC) [I48.0]      Encephalopathy (multifactorial) due to medication (bactrim), pneumonia, and UTI infections. - supportive care    KISHA - baseline creatinine 1.1. Gentle IVF and repeat in AM.      Pneumonia  With hypoxia -  probable viral but can not rule out super-imposed bacterial PNA    Rocephin/azith, neb, oxygen, mucinex, IS/Accapella; Is currently on 4 liter (96%), while on 3 liter patient desaturates to 86% but appeared asymptomatic. COVID negative. Anemia chronic disease- monitor and transfuse if hgb under 7    Viral URI (rhinovirus and RSV)- supportive care    HTN - continue home regimen    P AFIB - stable with flecainide/metoprolol. CURRENTLY IN NSR    UTI prior to admission - get records from PCP to see if urine cultured. D/C bactrim.   reculture UA    DVT prophylaxis: lovenox  CODE: FULL    Electronically signed by Porfirio Wong DO on 9/17/2021 at 12:56 PM

## 2021-09-17 NOTE — PROGRESS NOTES
Occupational Therapy   Occupational Therapy Initial Assessment  Date: 2021   Patient Name: Marietta Del Rosario  MRN: [id number]     : 1919    Date of Service: 2021    Discharge Recommendations:  Subacute/Skilled Nursing Facility       Assessment   Performance deficits / Impairments: Decreased functional mobility ; Decreased ADL status; Decreased strength;Decreased safe awareness;Decreased cognition;Decreased balance;Decreased endurance  Assessment: 79 yo female admitted with dx: rhinovirus who is displaying more confusion and decreased I with transfers. Pt presents with decreased I ADLs, transfers, and severely impaired cognition. OT to see pt to maximize I with transfers, I with adls, and improve cognition  Prognosis: Fair  Decision Making: High Complexity  History: see above  Exam: see above  OT Education: OT Role;Transfer Training  REQUIRES OT FOLLOW UP: Yes  Activity Tolerance  Activity Tolerance: Treatment limited secondary to decreased cognition  Safety Devices  Safety Devices in place: Yes  Type of devices: Bed alarm in place;Call light within reach;Nurse notified; Left in bed           Patient Diagnosis(es): The primary encounter diagnosis was Community acquired pneumonia, unspecified laterality. Diagnoses of Delirium, KISHA (acute kidney injury) ([facility] [address] Acute respiratory failure with hypoxemia ([address] Rhinovirus, and Respiratory syncytial virus (RSV) were also pertinent to this visit. has a past medical history of Bradycardia, Glaucoma, H/O cardiovascular stress test, H/O echocardiogram, [place] (hard of hearing), Hypertension, PAF (paroxysmal atrial fibrillation) ([address] [address] Risk for falls, and Thyroid disease. has a past surgical history that includes Hysterectomy.            Restrictions  Restrictions/Precautions  Restrictions/Precautions: Contact Precautions, Isolation, Fall Risk  Position Activity Restriction  Other position/activity restrictions: IV, O2, telemetry    Subjective   General  Chart Reviewed: Yes  Patient assessed for rehabilitation services?: Yes  Family / Caregiver Present: No  Patient Currently in Pain: Denies  Pain Assessment  Pain Assessment: 0-10  Vital Signs  Level of Consciousness: Responds to Voice (1)  Patient Currently in Pain: Denies  Social/Functional History  Social/Functional History  Lives With: Family (niece and niece's )  Additional Comments: Pt was too confuced and was also talking to people who are not there. Per , pt lives with her neiece and her  anduses a cane in the house and walker when outside of the house. The pt receives help for bathing and dressing       Objective        Orientation  Overall Orientation Status: Impaired  Orientation Level: Disoriented to place; Disoriented to time;Disoriented to situation  Observation/Palpation  Observation: Pt was supine in bed, HOB elevate, talking to someone that is not here  Balance  Sitting Balance: Contact guard assistance  Standing Balance: Minimal assistance  Functional Mobility  Functional - Mobility Device: Rolling Walker  Activity: Other (1-2 steps to [facility] with Mod A)  Assist Level: Moderate assistance  ADL  UE Bathing: Dependent/Total  LE Bathing: Dependent/Total  LE Dressing: Dependent/Total  Additional Comments: Pt was too confused to follow directions or understand what OT asked        Bed mobility  Supine to Sit: Moderate assistance  Sit to Supine: Minimal assistance  Transfers  Sit to stand: Moderate assistance  Stand to sit: Minimal assistance     Cognition  Overall Cognitive Status: Exceptions  Following Commands: Does not follow commands; Inconsistently follows commands  Attention Span: Unable to maintain attention  Memory: Decreased recall of recent events;Decreased short term memory;Decreased long term memory  Safety Judgement: Decreased awareness of need for safety;Decreased awareness of need for assistance  Problem Solving: Decreased awareness of errors;Assistance required to implement solutions;Assistance required to correct errors made;Assistance required to generate solutions;Assistance required to identify errors made  Insights: Not aware of deficits  Initiation: Requires cues for all  Sequencing: Requires cues for all        Sensation  Overall Sensation Status:  (Pt was unable to answer)        LUE AROM (degrees)  LUE General AROM: Pt was able to move her arms WFL but did not follow directions to do so  RUE AROM (degrees)  RUE General AROM: Pt spontaneously moved her arms WFL but did not follow directions  LUE Strength  LUE Strength Comment: unable to assess  RUE Strength  RUE Strength Comment: unable to assess                   Plan   Plan  Times per week: 1+  Current Treatment Recommendations: Strengthening, ROM, Balance Training, Functional Mobility Training, Endurance Training, Self-Care / ADL, Cognitive/Perceptual Training, Patient/Caregiver Education & Training    G-Code     OutComes Score                                                  AM-PAC Score       AM-PAC 6 click short form for inpatient daily activity:   How much help from another person does the patient currently need. .. Unable  Dep A Lot  Max A A Lot   Mod A A Little  Min A A Little   CGA  SBA None   Mod I  Indep  Sup   1. Putting on and taking off regular lower body clothing? [x] 1    [] 2   [] 2   [] 3   [] 3   [] 4      2. Bathing (including washing, rinsing, drying)? [x] 1   [] 2   [] 2 [] 3 [] 3 [] 4   3. Toileting, which includes using toilet, bedpan, or urinal? [x] 1    [] 2   [] 2   [] 3   [] 3   [] 4     4. Putting on and taking off regular upper body clothing? [x] 1   [] 2   [] 2   [] 3   [] 3    [] 4      5. Taking care of personal grooming such as brushing teeth? [] 1   [x] 2    [] 2 [] 3    [] 3   [] 4      6. Eating meals?    [] 1   [] 2   [] 2   [] 3   [x] 3   [] 4      Raw Score:  9/24 80-99% impaired     [24=0% impaired(CH), 23=1-19%(CI), 20-22=20-39%(CJ), 15-19=40-59%(CK), 10-14=60-79%(CL), 7-9=80-99%(CM), 6=100%(CN)]         Goals  Short term goals  Time Frame for Short term goals: until discharge  Short term goal 1: Pt will be CGA for functional transfers to the chair, toilet or bed w/o LOB or falls, following simple 1 step directions using LRD  Short term goal 2: Pt will be min A for UB adls following simple 1 step directions  Short term goal 3: Pt will be Mod A for LB adls following simple 1 step directions  Short term goal 4: Pt will follow 5/10 1 step directions with demonstration and min physical cues  Patient Goals   Patient goals : Pt wants to return home       Therapy Time   Individual Concurrent Group Co-treatment   Time In 8007         Time Out 1620         Minutes 25         Timed Code Treatment Minutes: 8 Minutes       Hillary Rangel, OT

## 2021-09-18 LAB
ANION GAP SERPL CALCULATED.3IONS-SCNC: 13 MMOL/L (ref 4–16)
BASOPHILS ABSOLUTE: 0.1 K/CU MM
BASOPHILS RELATIVE PERCENT: 0.8 % (ref 0–1)
BUN BLDV-MCNC: 23 MG/DL (ref 6–23)
CALCIUM SERPL-MCNC: 8.8 MG/DL (ref 8.3–10.6)
CHLORIDE BLD-SCNC: 105 MMOL/L (ref 99–110)
CO2: 18 MMOL/L (ref 21–32)
CREAT SERPL-MCNC: 1.1 MG/DL (ref 0.6–1.1)
DIFFERENTIAL TYPE: ABNORMAL
EOSINOPHILS ABSOLUTE: 0.2 K/CU MM
EOSINOPHILS RELATIVE PERCENT: 3.3 % (ref 0–3)
GFR AFRICAN AMERICAN: 55 ML/MIN/1.73M2
GFR NON-AFRICAN AMERICAN: 45 ML/MIN/1.73M2
GLUCOSE BLD-MCNC: 110 MG/DL (ref 70–99)
GLUCOSE BLD-MCNC: 65 MG/DL (ref 70–99)
GLUCOSE BLD-MCNC: 66 MG/DL (ref 70–99)
GLUCOSE BLD-MCNC: 68 MG/DL (ref 70–99)
GLUCOSE BLD-MCNC: 81 MG/DL (ref 70–99)
HCT VFR BLD CALC: 38.2 % (ref 37–47)
HEMOGLOBIN: 11.8 GM/DL (ref 12.5–16)
IMMATURE NEUTROPHIL %: 0.3 % (ref 0–0.43)
LYMPHOCYTES ABSOLUTE: 1.3 K/CU MM
LYMPHOCYTES RELATIVE PERCENT: 18.8 % (ref 24–44)
MCH RBC QN AUTO: 28 PG (ref 27–31)
MCHC RBC AUTO-ENTMCNC: 30.9 % (ref 32–36)
MCV RBC AUTO: 90.5 FL (ref 78–100)
MONOCYTES ABSOLUTE: 0.7 K/CU MM
MONOCYTES RELATIVE PERCENT: 10.4 % (ref 0–4)
PDW BLD-RTO: 15.4 % (ref 11.7–14.9)
PLATELET # BLD: 160 K/CU MM (ref 140–440)
PMV BLD AUTO: 10.6 FL (ref 7.5–11.1)
POTASSIUM SERPL-SCNC: 4.6 MMOL/L (ref 3.5–5.1)
RBC # BLD: 4.22 M/CU MM (ref 4.2–5.4)
SEGMENTED NEUTROPHILS ABSOLUTE COUNT: 4.4 K/CU MM
SEGMENTED NEUTROPHILS RELATIVE PERCENT: 66.4 % (ref 36–66)
SODIUM BLD-SCNC: 136 MMOL/L (ref 135–145)
TOTAL IMMATURE NEUTOROPHIL: 0.02 K/CU MM
WBC # BLD: 6.7 K/CU MM (ref 4–10.5)

## 2021-09-18 PROCEDURE — 6370000000 HC RX 637 (ALT 250 FOR IP): Performed by: NURSE PRACTITIONER

## 2021-09-18 PROCEDURE — 1200000000 HC SEMI PRIVATE

## 2021-09-18 PROCEDURE — 6360000002 HC RX W HCPCS: Performed by: PHYSICIAN ASSISTANT

## 2021-09-18 PROCEDURE — 2580000003 HC RX 258: Performed by: PHYSICIAN ASSISTANT

## 2021-09-18 PROCEDURE — 80048 BASIC METABOLIC PNL TOTAL CA: CPT

## 2021-09-18 PROCEDURE — 6370000000 HC RX 637 (ALT 250 FOR IP): Performed by: PHYSICIAN ASSISTANT

## 2021-09-18 PROCEDURE — 36415 COLL VENOUS BLD VENIPUNCTURE: CPT

## 2021-09-18 PROCEDURE — 94761 N-INVAS EAR/PLS OXIMETRY MLT: CPT

## 2021-09-18 PROCEDURE — 6370000000 HC RX 637 (ALT 250 FOR IP): Performed by: HOSPITALIST

## 2021-09-18 PROCEDURE — 82962 GLUCOSE BLOOD TEST: CPT

## 2021-09-18 PROCEDURE — 94640 AIRWAY INHALATION TREATMENT: CPT

## 2021-09-18 PROCEDURE — 85025 COMPLETE CBC W/AUTO DIFF WBC: CPT

## 2021-09-18 PROCEDURE — 2580000003 HC RX 258

## 2021-09-18 RX ORDER — SODIUM CHLORIDE 9 MG/ML
INJECTION, SOLUTION INTRAVENOUS
Status: COMPLETED
Start: 2021-09-18 | End: 2021-09-18

## 2021-09-18 RX ORDER — RISPERIDONE 0.25 MG/1
0.12 TABLET, FILM COATED ORAL NIGHTLY
Status: DISCONTINUED | OUTPATIENT
Start: 2021-09-18 | End: 2021-09-19

## 2021-09-18 RX ORDER — LORAZEPAM 0.5 MG/1
0.5 TABLET ORAL ONCE
Status: COMPLETED | OUTPATIENT
Start: 2021-09-18 | End: 2021-09-18

## 2021-09-18 RX ADMIN — SODIUM CHLORIDE 250 ML: 9 INJECTION, SOLUTION INTRAVENOUS at 07:03

## 2021-09-18 RX ADMIN — CEFTRIAXONE SODIUM 1000 MG: 1 INJECTION, POWDER, FOR SOLUTION INTRAMUSCULAR; INTRAVENOUS at 22:23

## 2021-09-18 RX ADMIN — IPRATROPIUM BROMIDE AND ALBUTEROL SULFATE 1 AMPULE: .5; 3 SOLUTION RESPIRATORY (INHALATION) at 15:42

## 2021-09-18 RX ADMIN — METOPROLOL SUCCINATE 25 MG: 25 TABLET, EXTENDED RELEASE ORAL at 10:13

## 2021-09-18 RX ADMIN — IPRATROPIUM BROMIDE AND ALBUTEROL SULFATE 1 AMPULE: .5; 3 SOLUTION RESPIRATORY (INHALATION) at 07:44

## 2021-09-18 RX ADMIN — SODIUM CHLORIDE: 9 INJECTION, SOLUTION INTRAVENOUS at 07:02

## 2021-09-18 RX ADMIN — LORAZEPAM 0.5 MG: 0.5 TABLET ORAL at 05:02

## 2021-09-18 RX ADMIN — FLECAINIDE ACETATE 50 MG: 50 TABLET ORAL at 10:13

## 2021-09-18 RX ADMIN — CEFTRIAXONE SODIUM 1000 MG: 1 INJECTION, POWDER, FOR SOLUTION INTRAMUSCULAR; INTRAVENOUS at 07:03

## 2021-09-18 RX ADMIN — GUAIFENESIN 600 MG: 600 TABLET, EXTENDED RELEASE ORAL at 10:13

## 2021-09-18 RX ADMIN — SERTRALINE 25 MG: 25 TABLET, FILM COATED ORAL at 10:13

## 2021-09-18 RX ADMIN — MULTIPLE VITAMINS W/ MINERALS TAB 1 TABLET: TAB at 10:12

## 2021-09-18 RX ADMIN — Medication 3 MG: at 05:02

## 2021-09-18 RX ADMIN — LISINOPRIL 20 MG: 20 TABLET ORAL at 10:13

## 2021-09-18 RX ADMIN — ENOXAPARIN SODIUM 40 MG: 40 INJECTION SUBCUTANEOUS at 10:13

## 2021-09-18 ASSESSMENT — PAIN SCALES - GENERAL: PAINLEVEL_OUTOF10: 0

## 2021-09-18 NOTE — PLAN OF CARE
Problem: Falls - Risk of:  Goal: Will remain free from falls  Description: Will remain free from falls  9/18/2021 0111 by Isaiah Harris LPN  Outcome: Ongoing  9/17/2021 1648 by Jayson Telles  Outcome: Ongoing  Goal: Absence of physical injury  Description: Absence of physical injury  9/18/2021 0111 by Isaiah Harris LPN  Outcome: Ongoing  9/17/2021 1648 by Jayson Telles  Outcome: Ongoing     Problem: Discharge Planning:  Goal: Discharged to appropriate level of care  Description: Discharged to appropriate level of care  9/18/2021 0111 by Isaiah Harris LPN  Outcome: Ongoing  9/17/2021 1648 by Jayson Telles  Outcome: Ongoing  Goal: Participates in care planning  Description: Participates in care planning  9/18/2021 0111 by Isaiah Harris LPN  Outcome: Ongoing  9/17/2021 1648 by Jayson Telles  Outcome: Ongoing     Problem: Airway Clearance - Ineffective:  Goal: Clear lung sounds  Description: Clear lung sounds  9/18/2021 0111 by Isaiah Harris LPN  Outcome: Ongoing  9/17/2021 1648 by Jayson Telles  Outcome: Ongoing  Goal: Ability to maintain a clear airway will improve  Description: Ability to maintain a clear airway will improve  9/18/2021 0111 by Isaiah Harris LPN  Outcome: Ongoing  9/17/2021 1648 by Jayson Telles  Outcome: Ongoing     Problem: Fluid Volume - Deficit:  Goal: Achieves intake and output within specified parameters  Description: Achieves intake and output within specified parameters  9/18/2021 0111 by Isaiah Harris LPN  Outcome: Ongoing  9/17/2021 1648 by Jayson Telles  Outcome: Ongoing     Problem: Gas Exchange - Impaired:  Goal: Levels of oxygenation will improve  Description: Levels of oxygenation will improve  9/18/2021 0111 by Isaiah Harris LPN  Outcome: Ongoing  9/17/2021 1648 by Jayson Telles  Outcome: Ongoing     Problem: Hyperthermia:  Goal: Ability to maintain a body temperature in the normal range will improve  Description: Ability to maintain a body temperature in the normal range will improve  9/18/2021 0111 by Reanna Saini LPN  Outcome: Ongoing  9/17/2021 1648 by Gray Moulton  Outcome: Ongoing

## 2021-09-18 NOTE — PROGRESS NOTES
Patient very confused and yelling out all night. Attempting to self ambulate multiple times, redirected but was unsuccessful. Patient pulled out Peripheral IV in right arm, attempted to replace IV, patient began yelling out and attempting to hit staff. Unsuccessful at this time. Arnie NP aware. Patient refusing to keep oxygen, Arnie NP aware.

## 2021-09-18 NOTE — PROGRESS NOTES
Progress Note      Subjective:   Chief complaint: delerium, cough    Interval History:   81 yo female treated for UTI by PCP on bactrim, who had worsening hallucinations (talking to people who aren't there). Over the pst 24 hours had cough, rhinitis, mild SOB. Brought to ED and found to have KISHA, RSV/rhinovirus URI , KISHA and pneumonia. She was placed on gentle IVF, rocephin/azith, nebs, oxygen. Patient creatinine has returned to normal baseline , therefore IVF stopped. She is now on room air. She is having increased delerium - active conversations with people not in room, picking at air, trying to cut breakfast tray and blanket with imaginary scizzors . Became frustrated and threw entire meal tray to floor. Laughing when asked questions. Is not oriented to person, place or time. Unable to obtain ROS. Last night nurses reported agitation with confusion. Poor sleep. Past medical history, surgical history, family history and social history reviewed and unchanged compared to H&P earlier this admission. Medications:   Scheduled Meds:   risperiDONE  0.125 mg Oral Nightly    flecainide  50 mg Oral BID    lisinopril  20 mg Oral Daily    metoprolol succinate  25 mg Oral Daily    therapeutic multivitamin-minerals  1 tablet Oral Daily    sodium chloride flush  5-40 mL IntraVENous 2 times per day    enoxaparin  40 mg SubCUTAneous Daily    ipratropium-albuterol  1 ampule Inhalation Q4H WA    cefTRIAXone (ROCEPHIN) IV  1,000 mg IntraVENous Q24H    And    azithromycin  500 mg Oral Q24H    sertraline  25 mg Oral Daily    guaiFENesin  600 mg Oral BID     Continuous Infusions:   sodium chloride 250 mL (09/18/21 0703)       Objective:     Vital Signs  Temp: 96.8 °F (36 °C)  Pulse: 73  Resp: 20  BP: (!) 110/92  SpO2: 100 %  O2 Device: None (Room air)  O2 Flow Rate (L/min): 3 L/min    Vital signs reviewed in electronic charts.     Physical exam  General: confused, not oriented to person,place, events; active conversations with auditory/visual hallucinations. Inappropriate laughter when asked questions. Not following commands but instead laughs and talks to people not in room  Heart - irreg with murmur  Lungs  - poor insp/expir effort, decreased breathsounds  Ext: no pedal edema  Abdomen soft NT ND NABS      Results:     Lab Results   Component Value Date    WBC 6.7 09/18/2021    HGB 11.8 (L) 09/18/2021    HCT 38.2 09/18/2021    MCV 90.5 09/18/2021     09/18/2021       Lab Results   Component Value Date     09/18/2021    K 4.6 09/18/2021     09/18/2021    CO2 18 09/18/2021    BUN 23 09/18/2021    CREATININE 1.1 09/18/2021    GLUCOSE 66 09/18/2021    CALCIUM 8.8 09/18/2021        Assessment and Plan: Active Hospital Problems    Diagnosis Date Noted    Pneumonia of both lower lobes due to infectious organism [J18.9] 09/17/2021     Priority: High    Hypoxemia [R09.02] 09/17/2021     Priority: High    KISHA (acute kidney injury) ([facility] [address] [N17.9] 09/17/2021     Priority: High    RSV (respiratory syncytial virus infection) [B97.4] 09/17/2021     Priority: Medium    [place] (hard of hearing) [H91.90] 10/22/2014    Hypertension [I10]     PAF (paroxysmal atrial fibrillation) (HCC) [I48.0]      Encephalopathy (multifactorial) due to medication (bactrim), infection (pneumonia, and UTI ) and lack of sleep -              visual and auditory hallucinations. Start risperdal 0.125 mg HS and monitor. Once delerium from bactrim and lack of sleep clears - then d/c risperdal.               Check TSH, B12 for other sources of delierum     KISHA - baseline creatinine 1.1. RESOLVED. Saline lock IVF.      Pneumonia  -  probable viral but can not rule out super-imposed bacterial PNA                  Rocephin/azith, neb, oxygen, mucinex, IS/Accapella; Improved and is on 98% on room air. COVID negative.    Repeat CXR in 2 days     Hypoxia - RESOLVED with treatment of pneumonia    Anemia chronic disease- monitor and transfuse if hgb under 7     Viral URI (rhinovirus and RSV)- supportive care     HTN - continue home regimen     P AFIB - stable with flecainide/metoprolol. CURRENTLY IN NSR     UTI prior to admission - get records from PCP to see if urine cultured. D/C bactrim.   reculture UA 9/17     DVT prophylaxis: lovenox  CODE: FULL    Electronically signed by Mikhail Garcia DO on 9/18/2021 at 10:49 AM

## 2021-09-18 NOTE — PROGRESS NOTES
Physical Therapy  Patient seen for PT eval this morning- patient very confused and unable to follow any directions- will continue PT evaluation @ a later time.  Salina Hernandez, PT, 9/18/2021,  9:25 AM

## 2021-09-18 NOTE — FLOWSHEET NOTE
Dr. Johanne Lyn to pt bedside to speak to nephew and wife at bedside. They had some concerns about her confusion. Pt family satisfied at this time.

## 2021-09-19 LAB
ALBUMIN SERPL-MCNC: 3.1 GM/DL (ref 3.4–5)
ALP BLD-CCNC: 59 IU/L (ref 40–129)
ALT SERPL-CCNC: 15 U/L (ref 10–40)
ANION GAP SERPL CALCULATED.3IONS-SCNC: 16 MMOL/L (ref 4–16)
AST SERPL-CCNC: 25 IU/L (ref 15–37)
BILIRUB SERPL-MCNC: 0.4 MG/DL (ref 0–1)
BUN BLDV-MCNC: 20 MG/DL (ref 6–23)
CALCIUM SERPL-MCNC: 8.9 MG/DL (ref 8.3–10.6)
CHLORIDE BLD-SCNC: 107 MMOL/L (ref 99–110)
CO2: 19 MMOL/L (ref 21–32)
CREAT SERPL-MCNC: 1 MG/DL (ref 0.6–1.1)
CULTURE: NORMAL
FOLATE: >20 NG/ML (ref 3.1–17.5)
GFR AFRICAN AMERICAN: >60 ML/MIN/1.73M2
GFR NON-AFRICAN AMERICAN: 51 ML/MIN/1.73M2
GLUCOSE BLD-MCNC: 67 MG/DL (ref 70–99)
Lab: NORMAL
POTASSIUM SERPL-SCNC: 4.6 MMOL/L (ref 3.5–5.1)
SODIUM BLD-SCNC: 142 MMOL/L (ref 135–145)
SPECIMEN: NORMAL
TOTAL PROTEIN: 6.7 GM/DL (ref 6.4–8.2)
TSH HIGH SENSITIVITY: 2.73 UIU/ML (ref 0.27–4.2)
VITAMIN B-12: 1418 PG/ML (ref 211–911)

## 2021-09-19 PROCEDURE — 82746 ASSAY OF FOLIC ACID SERUM: CPT

## 2021-09-19 PROCEDURE — 1200000000 HC SEMI PRIVATE

## 2021-09-19 PROCEDURE — 6370000000 HC RX 637 (ALT 250 FOR IP): Performed by: PHYSICIAN ASSISTANT

## 2021-09-19 PROCEDURE — 6370000000 HC RX 637 (ALT 250 FOR IP): Performed by: HOSPITALIST

## 2021-09-19 PROCEDURE — 82607 VITAMIN B-12: CPT

## 2021-09-19 PROCEDURE — 6360000002 HC RX W HCPCS: Performed by: PHYSICIAN ASSISTANT

## 2021-09-19 PROCEDURE — 2580000003 HC RX 258: Performed by: PHYSICIAN ASSISTANT

## 2021-09-19 PROCEDURE — 94761 N-INVAS EAR/PLS OXIMETRY MLT: CPT

## 2021-09-19 PROCEDURE — 94640 AIRWAY INHALATION TREATMENT: CPT

## 2021-09-19 PROCEDURE — 80053 COMPREHEN METABOLIC PANEL: CPT

## 2021-09-19 PROCEDURE — 84443 ASSAY THYROID STIM HORMONE: CPT

## 2021-09-19 PROCEDURE — 36415 COLL VENOUS BLD VENIPUNCTURE: CPT

## 2021-09-19 RX ORDER — RISPERIDONE 0.25 MG/1
0.12 TABLET, FILM COATED ORAL NIGHTLY PRN
Status: DISCONTINUED | OUTPATIENT
Start: 2021-09-19 | End: 2021-09-21 | Stop reason: HOSPADM

## 2021-09-19 RX ADMIN — RISPERIDONE 0.12 MG: 0.25 TABLET ORAL at 00:19

## 2021-09-19 RX ADMIN — FLECAINIDE ACETATE 50 MG: 50 TABLET ORAL at 20:47

## 2021-09-19 RX ADMIN — IPRATROPIUM BROMIDE AND ALBUTEROL SULFATE 1 AMPULE: .5; 3 SOLUTION RESPIRATORY (INHALATION) at 19:59

## 2021-09-19 RX ADMIN — FLECAINIDE ACETATE 50 MG: 50 TABLET ORAL at 00:17

## 2021-09-19 RX ADMIN — ENOXAPARIN SODIUM 40 MG: 40 INJECTION SUBCUTANEOUS at 10:36

## 2021-09-19 RX ADMIN — GUAIFENESIN 600 MG: 600 TABLET, EXTENDED RELEASE ORAL at 20:47

## 2021-09-19 RX ADMIN — GUAIFENESIN 600 MG: 600 TABLET, EXTENDED RELEASE ORAL at 10:32

## 2021-09-19 RX ADMIN — IPRATROPIUM BROMIDE AND ALBUTEROL SULFATE 1 AMPULE: .5; 3 SOLUTION RESPIRATORY (INHALATION) at 11:55

## 2021-09-19 RX ADMIN — ACETAMINOPHEN 650 MG: 325 TABLET ORAL at 00:17

## 2021-09-19 RX ADMIN — IPRATROPIUM BROMIDE AND ALBUTEROL SULFATE 1 AMPULE: .5; 3 SOLUTION RESPIRATORY (INHALATION) at 07:30

## 2021-09-19 RX ADMIN — GUAIFENESIN 600 MG: 600 TABLET, EXTENDED RELEASE ORAL at 00:18

## 2021-09-19 RX ADMIN — SODIUM CHLORIDE, PRESERVATIVE FREE 10 ML: 5 INJECTION INTRAVENOUS at 10:37

## 2021-09-19 RX ADMIN — IPRATROPIUM BROMIDE AND ALBUTEROL SULFATE 1 AMPULE: .5; 3 SOLUTION RESPIRATORY (INHALATION) at 15:20

## 2021-09-19 RX ADMIN — SERTRALINE 25 MG: 25 TABLET, FILM COATED ORAL at 10:33

## 2021-09-19 RX ADMIN — MULTIPLE VITAMINS W/ MINERALS TAB 1 TABLET: TAB at 10:33

## 2021-09-19 RX ADMIN — AZITHROMYCIN MONOHYDRATE 500 MG: 250 TABLET ORAL at 00:17

## 2021-09-19 RX ADMIN — LISINOPRIL 20 MG: 20 TABLET ORAL at 10:33

## 2021-09-19 RX ADMIN — METOPROLOL SUCCINATE 25 MG: 25 TABLET, EXTENDED RELEASE ORAL at 10:33

## 2021-09-19 RX ADMIN — FLECAINIDE ACETATE 50 MG: 50 TABLET ORAL at 10:32

## 2021-09-19 ASSESSMENT — PAIN SCALES - GENERAL
PAINLEVEL_OUTOF10: 5
PAINLEVEL_OUTOF10: 0

## 2021-09-19 NOTE — PLAN OF CARE
Problem: Falls - Risk of:  Goal: Will remain free from falls  Description: Will remain free from falls  9/19/2021 1121 by Carol Ann Taylor  Outcome: Ongoing  9/18/2021 2212 by Ha Davison LPN  Outcome: Ongoing  Goal: Absence of physical injury  Description: Absence of physical injury  9/19/2021 1121 by Carol Ann Taylor  Outcome: Ongoing  9/18/2021 2212 by Ha Davison LPN  Outcome: Ongoing     Problem: Discharge Planning:  Goal: Discharged to appropriate level of care  Description: Discharged to appropriate level of care  9/19/2021 1121 by Carol Ann Taylor  Outcome: Ongoing  9/18/2021 2212 by Ha Davison LPN  Outcome: Ongoing  Goal: Participates in care planning  Description: Participates in care planning  9/19/2021 1121 by Carol Ann Taylor  Outcome: Ongoing  9/18/2021 2212 by Ha Davison LPN  Outcome: Ongoing     Problem: Airway Clearance - Ineffective:  Goal: Clear lung sounds  Description: Clear lung sounds  9/19/2021 1121 by Carol Ann aTylor  Outcome: Ongoing  9/18/2021 2212 by Ha Davison LPN  Outcome: Ongoing  Goal: Ability to maintain a clear airway will improve  Description: Ability to maintain a clear airway will improve  9/19/2021 1121 by Carol Ann Taylor  Outcome: Ongoing  9/18/2021 2212 by Ha Davison LPN  Outcome: Ongoing     Problem: Fluid Volume - Deficit:  Goal: Achieves intake and output within specified parameters  Description: Achieves intake and output within specified parameters  9/19/2021 1121 by Carol Ann Taylor  Outcome: Ongoing  9/18/2021 2212 by Ha Davison LPN  Outcome: Ongoing     Problem: Gas Exchange - Impaired:  Goal: Levels of oxygenation will improve  Description: Levels of oxygenation will improve  9/19/2021 1121 by Carol Ann Taylor  Outcome: Ongoing  9/18/2021 2212 by Ha Davison LPN  Outcome: Ongoing     Problem: Hyperthermia:  Goal: Ability to maintain a body temperature in the normal range will improve  Description: Ability to maintain a body temperature in the normal range will improve  9/19/2021 1121 by Yvette Nas  Outcome: Ongoing  9/18/2021 2212 by Ernie Styles LPN  Outcome: Ongoing

## 2021-09-19 NOTE — PROGRESS NOTES
Progress Note      Subjective:   Chief complaint: delirium (RESOLVED), cough due to pneumonia    Interval History: 79 yo female treated for UTI by PCP on bactrim, who had worsening hallucinations (talking to people who aren't there).   Over the pst 24 hours had cough, rhinitis, mild SOB.  Brought to ED and found to have KISHA, RSV/rhinovirus URI  and pneumonia.   She was placed on gentle IVF, rocephin/azith, nebs, oxygen. Patient creatinine has returned to normal baseline , therefore IVF stopped. She is now on room air. Patient slept well last night after receiving very low dose risperdal.  This AM 9/19/21 woke up with no further delerium or confusion. She had no hallucinations. Has no recollection of how she got to hospital. She still has productive cough, no N, no V, no CP, no SOB, no HA. She is hard of hearing but hearing aid helps. Past medical history, surgical history, family history and social history reviewed and unchanged compared to H&P earlier this admission. Medications:   Scheduled Meds:   risperiDONE  0.125 mg Oral Nightly    flecainide  50 mg Oral BID    lisinopril  20 mg Oral Daily    metoprolol succinate  25 mg Oral Daily    therapeutic multivitamin-minerals  1 tablet Oral Daily    sodium chloride flush  5-40 mL IntraVENous 2 times per day    enoxaparin  40 mg SubCUTAneous Daily    ipratropium-albuterol  1 ampule Inhalation Q4H WA    cefTRIAXone (ROCEPHIN) IV  1,000 mg IntraVENous Q24H    And    azithromycin  500 mg Oral Q24H    sertraline  25 mg Oral Daily    guaiFENesin  600 mg Oral BID     Continuous Infusions:   sodium chloride 250 mL (09/18/21 0703)       Objective:     Vital Signs  Temp: 97.5 °F (36.4 °C)  Pulse: 67  Resp: 18  BP: (!) 151/67  SpO2: 97 %  O2 Device: None (Room air)  O2 Flow Rate (L/min): 3 L/min    Vital signs reviewed in electronic charts. Physical exam  General: pleasant Awake, alert, NAD.  [place] to name, place and year (not to day as patient pneumonia     Anemia chronic disease- monitor and transfuse if hgb under 7     HTN - continue home regimen     P AFIB - stable with flecainide/metoprolol.  CURRENTLY IN NSR     DVT prophylaxis: SCD/ambulate  CODE: FULL   Disposition - await eval by PT to determine if debility and SWING or home with family Monday/tuesday    Electronically signed by Cristopher Brady DO on 9/19/2021 at 10:02 AM

## 2021-09-20 ENCOUNTER — APPOINTMENT (OUTPATIENT)
Dept: GENERAL RADIOLOGY | Age: 86
DRG: 193 | End: 2021-09-20
Payer: MEDICARE

## 2021-09-20 LAB
ANION GAP SERPL CALCULATED.3IONS-SCNC: 10 MMOL/L (ref 4–16)
BASOPHILS ABSOLUTE: 0.1 K/CU MM
BASOPHILS RELATIVE PERCENT: 0.6 % (ref 0–1)
BUN BLDV-MCNC: 25 MG/DL (ref 6–23)
CALCIUM SERPL-MCNC: 8.6 MG/DL (ref 8.3–10.6)
CHLORIDE BLD-SCNC: 105 MMOL/L (ref 99–110)
CO2: 22 MMOL/L (ref 21–32)
CREAT SERPL-MCNC: 1 MG/DL (ref 0.6–1.1)
DIFFERENTIAL TYPE: ABNORMAL
EOSINOPHILS ABSOLUTE: 0.1 K/CU MM
EOSINOPHILS RELATIVE PERCENT: 1.5 % (ref 0–3)
GFR AFRICAN AMERICAN: >60 ML/MIN/1.73M2
GFR NON-AFRICAN AMERICAN: 51 ML/MIN/1.73M2
GLUCOSE BLD-MCNC: 95 MG/DL (ref 70–99)
HCT VFR BLD CALC: 36.1 % (ref 37–47)
HEMOGLOBIN: 11.3 GM/DL (ref 12.5–16)
IMMATURE NEUTROPHIL %: 0.3 % (ref 0–0.43)
LYMPHOCYTES ABSOLUTE: 1.8 K/CU MM
LYMPHOCYTES RELATIVE PERCENT: 19.8 % (ref 24–44)
MCH RBC QN AUTO: 27.7 PG (ref 27–31)
MCHC RBC AUTO-ENTMCNC: 31.3 % (ref 32–36)
MCV RBC AUTO: 88.5 FL (ref 78–100)
MONOCYTES ABSOLUTE: 0.9 K/CU MM
MONOCYTES RELATIVE PERCENT: 10.1 % (ref 0–4)
PDW BLD-RTO: 15.4 % (ref 11.7–14.9)
PLATELET # BLD: 197 K/CU MM (ref 140–440)
PMV BLD AUTO: 9.3 FL (ref 7.5–11.1)
POTASSIUM SERPL-SCNC: 4.3 MMOL/L (ref 3.5–5.1)
RBC # BLD: 4.08 M/CU MM (ref 4.2–5.4)
SEGMENTED NEUTROPHILS ABSOLUTE COUNT: 6.1 K/CU MM
SEGMENTED NEUTROPHILS RELATIVE PERCENT: 67.7 % (ref 36–66)
SODIUM BLD-SCNC: 137 MMOL/L (ref 135–145)
TOTAL IMMATURE NEUTOROPHIL: 0.03 K/CU MM
WBC # BLD: 9.1 K/CU MM (ref 4–10.5)

## 2021-09-20 PROCEDURE — 85025 COMPLETE CBC W/AUTO DIFF WBC: CPT

## 2021-09-20 PROCEDURE — 6360000002 HC RX W HCPCS: Performed by: PHYSICIAN ASSISTANT

## 2021-09-20 PROCEDURE — 6370000000 HC RX 637 (ALT 250 FOR IP): Performed by: PHYSICIAN ASSISTANT

## 2021-09-20 PROCEDURE — 94640 AIRWAY INHALATION TREATMENT: CPT

## 2021-09-20 PROCEDURE — 71045 X-RAY EXAM CHEST 1 VIEW: CPT

## 2021-09-20 PROCEDURE — 80048 BASIC METABOLIC PNL TOTAL CA: CPT

## 2021-09-20 PROCEDURE — 36415 COLL VENOUS BLD VENIPUNCTURE: CPT

## 2021-09-20 PROCEDURE — 97116 GAIT TRAINING THERAPY: CPT

## 2021-09-20 PROCEDURE — 2580000003 HC RX 258: Performed by: PHYSICIAN ASSISTANT

## 2021-09-20 PROCEDURE — 6370000000 HC RX 637 (ALT 250 FOR IP): Performed by: HOSPITALIST

## 2021-09-20 PROCEDURE — 97162 PT EVAL MOD COMPLEX 30 MIN: CPT

## 2021-09-20 PROCEDURE — 94761 N-INVAS EAR/PLS OXIMETRY MLT: CPT

## 2021-09-20 PROCEDURE — 97535 SELF CARE MNGMENT TRAINING: CPT

## 2021-09-20 PROCEDURE — 1200000000 HC SEMI PRIVATE

## 2021-09-20 RX ORDER — TRAZODONE HYDROCHLORIDE 50 MG/1
12.5 TABLET ORAL NIGHTLY PRN
Status: DISCONTINUED | OUTPATIENT
Start: 2021-09-20 | End: 2021-09-21 | Stop reason: HOSPADM

## 2021-09-20 RX ADMIN — FLECAINIDE ACETATE 50 MG: 50 TABLET ORAL at 19:53

## 2021-09-20 RX ADMIN — CEFTRIAXONE SODIUM 1000 MG: 1 INJECTION, POWDER, FOR SOLUTION INTRAMUSCULAR; INTRAVENOUS at 00:15

## 2021-09-20 RX ADMIN — GUAIFENESIN 600 MG: 600 TABLET, EXTENDED RELEASE ORAL at 08:37

## 2021-09-20 RX ADMIN — MULTIPLE VITAMINS W/ MINERALS TAB 1 TABLET: TAB at 08:37

## 2021-09-20 RX ADMIN — IPRATROPIUM BROMIDE AND ALBUTEROL SULFATE 1 AMPULE: .5; 3 SOLUTION RESPIRATORY (INHALATION) at 08:20

## 2021-09-20 RX ADMIN — CEFTRIAXONE SODIUM 1000 MG: 1 INJECTION, POWDER, FOR SOLUTION INTRAMUSCULAR; INTRAVENOUS at 22:23

## 2021-09-20 RX ADMIN — SODIUM CHLORIDE 25 ML: 9 INJECTION, SOLUTION INTRAVENOUS at 22:22

## 2021-09-20 RX ADMIN — LISINOPRIL 20 MG: 20 TABLET ORAL at 08:37

## 2021-09-20 RX ADMIN — AZITHROMYCIN MONOHYDRATE 500 MG: 250 TABLET ORAL at 00:03

## 2021-09-20 RX ADMIN — SODIUM CHLORIDE, PRESERVATIVE FREE 10 ML: 5 INJECTION INTRAVENOUS at 09:00

## 2021-09-20 RX ADMIN — SERTRALINE 25 MG: 25 TABLET, FILM COATED ORAL at 08:37

## 2021-09-20 RX ADMIN — GUAIFENESIN 600 MG: 600 TABLET, EXTENDED RELEASE ORAL at 19:53

## 2021-09-20 RX ADMIN — METOPROLOL SUCCINATE 25 MG: 25 TABLET, EXTENDED RELEASE ORAL at 08:37

## 2021-09-20 RX ADMIN — SODIUM CHLORIDE, PRESERVATIVE FREE 10 ML: 5 INJECTION INTRAVENOUS at 19:53

## 2021-09-20 RX ADMIN — IPRATROPIUM BROMIDE AND ALBUTEROL SULFATE 1 AMPULE: .5; 3 SOLUTION RESPIRATORY (INHALATION) at 20:20

## 2021-09-20 RX ADMIN — IPRATROPIUM BROMIDE AND ALBUTEROL SULFATE 1 AMPULE: .5; 3 SOLUTION RESPIRATORY (INHALATION) at 15:09

## 2021-09-20 RX ADMIN — SODIUM CHLORIDE, PRESERVATIVE FREE 10 ML: 5 INJECTION INTRAVENOUS at 00:06

## 2021-09-20 RX ADMIN — SODIUM CHLORIDE 25 ML: 9 INJECTION, SOLUTION INTRAVENOUS at 22:17

## 2021-09-20 RX ADMIN — FLECAINIDE ACETATE 50 MG: 50 TABLET ORAL at 08:37

## 2021-09-20 ASSESSMENT — PAIN SCALES - GENERAL
PAINLEVEL_OUTOF10: 0
PAINLEVEL_OUTOF10: 0

## 2021-09-20 NOTE — PROGRESS NOTES
Occupational Therapy    Occupational Therapy Treatment Note  Name: Noe Bullard MRN: [id number] :   1919   Date:  2021   Admission Date: 2021 Room:  [address] [place] [address]   Restrictions/Precautions:  Restrictions/Precautions  Restrictions/Precautions: Contact Precautions, Isolation, Fall Risk     Communication with other providers:   Cleared for treatment by RN. Subjective:  Patient states: \"Am I at my neighbor's house? \"  Pain:   Location, Type, Intensity (0/10 to 10/10): No pain    Objective:    Observation:  Pt alert and oriented. Treatment, including education:  Transfers  Supine to sit :SBA using features of bed with mod verbal cues to intitiate  Scooting :SBA  Sit to stand :Min A  Stand to sit :[place] [address] Training:   Activities performed today included the following: Toileting  Incontinent of urine    Grooming  Declined oral care, Sup to wash face with min verbal cues to initiate. Bathing   CGA in stance with min verbal cues to initiate. UB Dress  Donned gown    LB Dress  Mod A to thread B LEs into pull up brief and CGA in stance for balance with min verbal cues to initiate. Therapeutic Activity Training: Pt completed functional mobility in room with RW x 7' with CGA. Pt stood x 3 min with CGA with RW to facilitate increased endurance/strength for ADL tasks and transfers. Safety Measures: Gait belt used, up in chair, Pull/Bed Alarm activated and call light left in reach        Assessment / Impression:        Patient's tolerance of treatment: Good   Adverse Reaction: None  Significant change in status and impact:  None  Barriers to improvement: Dec strength and endurance    Plan for Next Session:    Continue with POC.     Time in:  930  Time out:  1010  Total treatment time:  40  Billed Units: 3 ADL  Electronically signed by:    Radha Whalen, [address]    2021, 10:17 AM    Previously filed values:  Patient Goals   Patient goals : Pt wants to return home  Short term goals  Time Frame for Short term goals: until discharge  Short term goal 1: Pt will be CGA for functional transfers to the chair, toilet or bed w/o LOB or falls, following simple 1 step directions using LRD  Short term goal 2: Pt will be min A for UB adls following simple 1 step directions  Short term goal 3: Pt will be Mod A for LB adls following simple 1 step directions  Short term goal 4: Pt will follow 5/10 1 step directions with demonstration and min physical cues

## 2021-09-20 NOTE — PROGRESS NOTES
Physical Therapy    Facility/Department: [facility] UNIT  Initial Assessment    NAME: Gómez Richardson  : 1919  MRN: [id number]    Date of Service: 2021    Discharge Recommendations:  Subacute/Skilled Nursing Facility        Assessment   Body structures, Functions, Activity limitations: Decreased cognition;Decreased safe awareness;Decreased functional mobility   Assessment: patient is a 79 yo female admitted to [facility] with pneumonia- patient demonstrates function and cognition below baseline;patient  would benefit from skilled PT intervention and if confusion subsides patient would be appropriate for swing bed admission  Treatment Diagnosis: impaired mobility/confusion  Prognosis: Good  Decision Making: Medium Complexity  History: PF - confusion  Exam: AROM/tranfers/ gait  Clinical Presentation: changing characteristics of function due to recent illness  Barriers to Learning: confusion  REQUIRES PT FOLLOW UP: Yes       Patient Diagnosis(es): The primary encounter diagnosis was Community acquired pneumonia, unspecified laterality. Diagnoses of Delirium, KISHA (acute kidney injury) ([address] Acute respiratory failure with hypoxemia ([address] Rhinovirus, and Respiratory syncytial virus (RSV) were also pertinent to this visit. has a past medical history of Bradycardia, Glaucoma, H/O cardiovascular stress test, H/O echocardiogram, [place] (hard of hearing), Hypertension, PAF (paroxysmal atrial fibrillation) ([address] Risk for falls, and Thyroid disease. has a past surgical history that includes Hysterectomy.     Restrictions  Restrictions/Precautions  Restrictions/Precautions: Contact Precautions, Isolation, Fall Risk  Position Activity Restriction  Other position/activity restrictions: telemetry  Vision/Hearing  Vision: Impaired  Vision Exceptions: Wears glasses at all times  Hearing: Within functional limits     Subjective  General  Chart Reviewed: Yes  Patient assessed for rehabilitation services?: Yes     Pre Treatment Pain Screening  Pain at present: 0    Orientation  Orientation  Overall Orientation Status: Impaired  Orientation Level: Disoriented X4  Social/Functional History  Social/Functional History  Lives With: Family  Home Layout: One level  Home Equipment: Cane, Rolling walker  ADL Assistance: Needs assistance (bathing/dressing)  Homemaking Assistance: Needs assistance  Active : No  Occupation: Retired  Additional Comments: Pt was too confuced and was also talking to people who are not there. Per , pt lives with her neiece and her  anduses a cane in the house and walker when outside of the house.   The pt receives help for bathing and dressing  Cognition        Objective     Observation/Palpation  Posture: Fair  Observation: patient up oin chair    AROM RLE (degrees)  RLE AROM: WFL  AROM LLE (degrees)  LLE AROM : WFL  Strength RLE  Strength RLE: WFL  Strength LLE  Strength LLE: WFL        Bed mobility  Supine to Sit: Stand by assistance  Sit to Supine: Stand by assistance  Transfers  Sit to Stand: Contact guard assistance  Stand to sit: Contact guard assistance  Ambulation  Ambulation?: Yes  Ambulation 1  Device: Rolling Walker  Assistance: Contact guard assistance  Distance: 25 ft     Balance  Standing - Static: Fair  Standing - Dynamic: Fair;-        Plan   Plan  Times per week: 4+  Current Treatment Recommendations: Transfer Training, Functional Mobility Training, ADL/Self-care Training, Endurance Training  Safety Devices  Type of devices: Gait belt, Left in chair, Chair alarm in place, Call light within reach    G-Code       OutComes Score                                                  AM-PAC Score        Basic Mobility Six Clicks Form [employer] AM-PAC Score Conversion Table   How much difficulty does the patient currently have Unable   (pt is unable to do activity) A Lot   (activity is a struggle, requires great effort/time) A Little   (pt can manage, but takes more effort/time than should) None   (pt has no difficulty) Raw Score Standardized Score CMS -100% Score CMS Modifier        6 23.55 100% CN   Turning over in bed (including adjusting bedclothes, sheets, and blankets)? []1 []2  [x]3  []4  7 26.42 92.36% CM        8 28.58 86.62% CM   Sitting down on and standing up from a chair with arms (e.g. wheelchair, bedside commode, etc.)? []1 []2 [x]3   []4   9 30.55 81.38% CM        10 32.29 76.75% CL   Moving from lying on back to sitting on the side of the bed? []1 []2  [x]3   []4   11 33.86 72.57% CL        12 35.33 68.66% CL   How much help from another person does the patient currently need Total   (Total/Dependent Assist) A Lot   (Max/Mod Assist) A Little   (Min/CGA/Supervision) None   (No human assistance) 13 36.74 64.91% CL        14 38.1 61.29% CL   Moving to and from a bed to a chair (including a wheelchair)? []1  []2   [x]3  []4   15 39.45 57.70% CK        16 40.78 54.16% CK   To walk in a hospital room? []1 []2   [x]3    []4  17 42.13 50.57% CK        18 43.63 46.58% CK   Climbing 3-5 steps with a railing?  [x]1  []2   []3    []4  19 45.44 41.77% CK        20 47.67 35.83% CJ   Raw Score 16  21 50.25 28.97% CJ   Standardized Score   22 53.28 20.91% CJ   CMS 0-100% Score   23 56.93 11.20% CI   CMS Modifier  24 61.14 0.00% CH     CH = 0% impaired  CI = 1-20% impaired  CJ = 20-40% impaired  CK = 40-60% impaired  CL = 60-80% impaired  CM = % impaired  CN = 100% impaired          Goals  Short term goals  Time Frame for Short term goals: 1  weekj  Short term goal 1: patient will be Mod I with all bed mobility and transfer activities  Short term goal 2: patient will ambulate 150 ft with RW and mod I       Therapy Time   Individual Concurrent Group Co-treatment   Time In 1040         Time Out 1100         Minutes 20         Timed Code Treatment Minutes: 10 Minutes       ADRIENNE Carpenter, PT

## 2021-09-20 NOTE — PROGRESS NOTES
Patient noted to have increased confusion this shift. Attempting to climb out of bed and chair multiple times to \"fix the table and chairs\" \"leave this place\". Patient states she is not in a hospital and is in a \"new place down town. \" Able to redirect patient without difficulty.

## 2021-09-20 NOTE — PLAN OF CARE
maintain a body temperature in the normal range will improve  9/20/2021 1014 by Nafisa Garcia RN  Outcome: Ongoing  9/19/2021 2357 by Farshad Diaz RN  Outcome: Ongoing     Problem: Skin Integrity:  Goal: Will show no infection signs and symptoms  Description: Will show no infection signs and symptoms  Outcome: Ongoing  Goal: Absence of new skin breakdown  Description: Absence of new skin breakdown  Outcome: Ongoing

## 2021-09-20 NOTE — DISCHARGE INSTR - COC
Continuity of Care Form    Patient Name: Nhung Ann   :  1919  MRN:  [id number]    Admit date:  2021  Discharge date:  2021    Code Status Order: Full Code   Advance Directives:      Admitting Physician:  Viktoriya Hoyt DO  PCP: Gordy Acosta MD    Discharging Nurse: Ramila [address] Unit/Room#: 564/295-16  Discharging Unit Phone Number: [phone]    Emergency Contact:   Extended Emergency Contact Information  Primary Emergency Contact: [facility]  Address: [place], [place] [address] [address] Phone: [phone]  Mobile Phone: [phone]  Relation: Niece/Nephew  Secondary Emergency Contact: [facility] [address] Phone: [phone]  Relation: Niece/Nephew    Past Surgical History:  Past Surgical History:   Procedure Laterality Date    HYSTERECTOMY         Immunization History:   Immunization History   Administered Date(s) Administered    Influenza, High Dose (Fluzone 65 yrs and older) 10/21/2019    Influenza, [place], Recombinant, IM PF (Flublok 18 yrs and older) 10/06/2020       Active Problems:  Patient Active Problem List   Diagnosis Code    Hypertension I10    [facility]oxysmal [facility] fibrillation[facility] I48.0    Abnormal ECG R94.31    [place] (hard of hearing) H91.90    Bradycardia R00.1    Risk for falls Z91.81    Irregular heartbeat I49.9    Pneumonia of both lower lobes due to infectious organism J18.9    RSV (respiratory syncytial virus infection) B97.4    Hypoxemia R09.02    KISHA (acute kidney injury) ([facility] [address] N17.9       Isolation/Infection:   Isolation            Contact  Droplet          Patient Infection Status       Infection Onset Added Last Indicated Last Indicated By Review Planned Expiration Resolved Resolved By    Rhinovirus 21 Respiratory Panel, Molecular, with COVID-19 (Restricted: peds pts or suitable admitted adults) 21       Resolved    COVID-19 Rule Out 21 belongings (please select all that are sent with patient):  Glasses, Hearing Aides bilateral, Dentures upper    RN SIGNATURE:  Electronically signed by Aram Alvarez RN on 9/21/21 at 11:48 AM EDT    CASE MANAGEMENT/SOCIAL WORK SECTION    Inpatient Status Date: 9/17/2021    Readmission Risk Assessment Score:  Readmission Risk              Risk of Unplanned Readmission:  14           Discharging to Facility/ Agency   · Name: [facility]am Macdonald   · [address] [place] [address]  · Phone: [phone]  · Fax: [phone]    Dialysis Facility (if applicable)   · Name:  · Address:  · Dialysis Schedule:  · Phone:  · Fax:    / signature: Electronically signed by Alfred Downing RN on 9/20/21 at 12:42 PM EDT    PHYSICIAN SECTION    Prognosis: Fair    Condition at Discharge: Stable    Rehab Potential (if transferring to Rehab): {Prognosis:[id number]}    Recommended Labs or Other Treatments After Discharge: CBC and BMP in 3 to 5 days    Physician Certification: I certify the above information and transfer of [place]  is necessary for the continuing treatment of the diagnosis listed and that she requires [facility] for greater 30 days.      Update Admission H&P: No change in H&P    PHYSICIAN SIGNATURE:  Electronically signed by Smitha Farley MD on 9/21/21 at 9:38 AM EDT

## 2021-09-20 NOTE — PLAN OF CARE
Problem: Falls - Risk of:  Goal: Will remain free from falls  Description: Will remain free from falls  9/19/2021 2357 by Benedict Hightower RN  Outcome: Ongoing  9/19/2021 1121 by Sudhir Kovacs  Outcome: Ongoing  Goal: Absence of physical injury  Description: Absence of physical injury  9/19/2021 2357 by Benedict Hightower RN  Outcome: Ongoing  9/19/2021 1121 by Sudhir Kovacs  Outcome: Ongoing     Problem: Discharge Planning:  Goal: Discharged to appropriate level of care  Description: Discharged to appropriate level of care  9/19/2021 2357 by Benedict Hightower RN  Outcome: Ongoing  9/19/2021 1121 by Sudhir Kovacs  Outcome: Ongoing  Goal: Participates in care planning  Description: Participates in care planning  9/19/2021 2357 by Benedict Hightower RN  Outcome: Ongoing  9/19/2021 1121 by Sudhir Kovacs  Outcome: Ongoing     Problem: Airway Clearance - Ineffective:  Goal: Clear lung sounds  Description: Clear lung sounds  9/19/2021 2357 by Benedict Hightower RN  Outcome: Ongoing  9/19/2021 1121 by Sudhir Kovacs  Outcome: Ongoing  Goal: Ability to maintain a clear airway will improve  Description: Ability to maintain a clear airway will improve  9/19/2021 2357 by Benedict Hightower RN  Outcome: Ongoing  9/19/2021 1121 by Sudhir Kovacs  Outcome: Ongoing     Problem: Fluid Volume - Deficit:  Goal: Achieves intake and output within specified parameters  Description: Achieves intake and output within specified parameters  9/19/2021 2357 by Benedict Hightower RN  Outcome: Ongoing  9/19/2021 1121 by Sudhir Kovacs  Outcome: Ongoing     Problem: Gas Exchange - Impaired:  Goal: Levels of oxygenation will improve  Description: Levels of oxygenation will improve  9/19/2021 2357 by Benedict Hightower RN  Outcome: Ongoing  9/19/2021 1121 by Sudhir Kovacs  Outcome: Ongoing     Problem: Hyperthermia:  Goal: Ability to maintain a body temperature in the normal range will improve  Description: Ability to maintain a body temperature in the normal range will improve  9/19/2021 4357 by Dmitry Arias RN  Outcome: Ongoing  9/19/2021 1121 by Sotero Carrington  Outcome: Ongoing

## 2021-09-20 NOTE — PROGRESS NOTES
Progress Note      Subjective:   Chief complaint: pneumonia, encephalopathy    Interval History: 79 yo female treated for UTI by PCP on bactrim, who had worsening hallucinations (talking to people who aren't there).   Over the pst 24 hours had cough, rhinitis, mild SOB.  Brought to ED and found to have KISHA, RSV/rhinovirus URI  and pneumonia.   She was placed on gentle IVF, rocephin/azith, nebs, oxygen. Patient creatinine has returned to normal baseline , therefore IVF stopped. She continues to have productive cough, but is on room air. She remains afebrile. She is intermittently confused (oriented to person but not place). She received risperdal at 0019 last night. She is not having hallucinations. She states no CP, no N, no V. Good appetite. Her BP is elevated this AM but she has not yet received morning meds.        Past medical history, surgical history, family history and social history reviewed and unchanged compared to H&P earlier this admission. Medications:   Scheduled Meds:   flecainide  50 mg Oral BID    lisinopril  20 mg Oral Daily    metoprolol succinate  25 mg Oral Daily    therapeutic multivitamin-minerals  1 tablet Oral Daily    sodium chloride flush  5-40 mL IntraVENous 2 times per day    ipratropium-albuterol  1 ampule Inhalation Q4H WA    cefTRIAXone (ROCEPHIN) IV  1,000 mg IntraVENous Q24H    And    azithromycin  500 mg Oral Q24H    sertraline  25 mg Oral Daily    guaiFENesin  600 mg Oral BID     Continuous Infusions:   sodium chloride 250 mL (09/18/21 0703)       Objective:     Vital Signs  Temp: 97.7 °F (36.5 °C)  Pulse: 72  Resp: 18  BP: (!) 178/72  SpO2: 94 %  O2 Device: None (Room air)  O2 Flow Rate (L/min): 3 L/min    Vital signs reviewed in electronic charts. Physical exam  General: pleasant Awake, alert, NAD.  [place] to name,   Onel Coil with course rhonchi, no wheeze, no rales  Neuro: moving all extremities  Ext: no edema    Results:     Lab Results Component Value Date    WBC 9.1 09/20/2021    HGB 11.3 (L) 09/20/2021    HCT 36.1 (L) 09/20/2021    MCV 88.5 09/20/2021     09/20/2021       Lab Results   Component Value Date     09/20/2021    K 4.3 09/20/2021     09/20/2021    CO2 22 09/20/2021    BUN 25 09/20/2021    CREATININE 1.0 09/20/2021    GLUCOSE 95 09/20/2021    CALCIUM 8.6 09/20/2021        Assessment and Plan: Active Hospital Problems    Diagnosis Date Noted    Pneumonia of both lower lobes due to infectious organism [J18.9] 09/17/2021     Priority: High    Hypoxemia [R09.02] 09/17/2021     Priority: High    KISHA (acute kidney injury) ([facility] [address] [N17.9] 09/17/2021     Priority: High    RSV (respiratory syncytial virus infection) [B97.4] 09/17/2021     Priority: Medium    [place] (hard of hearing) [H91.90] 10/22/2014    Hypertension [I10]     PAF (paroxysmal atrial fibrillation) (HCC) [I48.0]      Encephalopathy (multifactorial) due to medication (bactrim), infection (pneumonia, and UTI ) and lack of sleep, hard o f hearing - IMPROVING - no more visual and auditory hallucinations,  No picking at air. Kimberly Zuluaga dc risperdal     KISHA - baseline creatinine 1. 1.  RESOLVED.  D/C IV     Viral URI (rhinovirus and RSV)- supportive care     Pneumonia  -  probable viral but can not rule out super-imposed bacterial PNA                  Rocephin/azith, neb, oxygen, mucinex, IS/Accapella;  Improved and is on 98% on room air.    COVID negative.   Repeat CXR  pending and depending on results,  changing to oral antibiotic on 9/20/21     UTI prior to admission - get records from PCP to see if urine cultured.   D/C bactrim.  reculture UA 9/17 and pending.     Hypoxia - RESOLVED with treatment of pneumonia     Anemia chronic disease- monitor and transfuse if hgb under 7     HTN - continue home regimen     P AFIB - stable with flecainide/metoprolol.  CURRENTLY IN NSR      DVT prophylaxis: SCD/ambulate  CODE: FULL   Disposition - await eval by PT to determine if debility and SWING or home with family     Electronically signed by Romana Caraway, DO on 9/20/2021 at 9:42 AM

## 2021-09-21 VITALS
TEMPERATURE: 96.2 F | DIASTOLIC BLOOD PRESSURE: 68 MMHG | BODY MASS INDEX: 29.69 KG/M2 | SYSTOLIC BLOOD PRESSURE: 162 MMHG | HEART RATE: 67 BPM | HEIGHT: 55 IN | OXYGEN SATURATION: 96 % | WEIGHT: 128.3 LBS | RESPIRATION RATE: 20 BRPM

## 2021-09-21 LAB
SARS-COV-2, NAAT: NOT DETECTED
SOURCE: NORMAL

## 2021-09-21 PROCEDURE — 94640 AIRWAY INHALATION TREATMENT: CPT

## 2021-09-21 PROCEDURE — 6370000000 HC RX 637 (ALT 250 FOR IP): Performed by: PHYSICIAN ASSISTANT

## 2021-09-21 PROCEDURE — 97116 GAIT TRAINING THERAPY: CPT

## 2021-09-21 PROCEDURE — 6370000000 HC RX 637 (ALT 250 FOR IP): Performed by: HOSPITALIST

## 2021-09-21 PROCEDURE — 97530 THERAPEUTIC ACTIVITIES: CPT

## 2021-09-21 PROCEDURE — 97535 SELF CARE MNGMENT TRAINING: CPT

## 2021-09-21 PROCEDURE — 87635 SARS-COV-2 COVID-19 AMP PRB: CPT

## 2021-09-21 RX ORDER — GUAIFENESIN 600 MG/1
600 TABLET, EXTENDED RELEASE ORAL 2 TIMES DAILY
Qty: 60 TABLET | Refills: 0 | Status: SHIPPED | OUTPATIENT
Start: 2021-09-21

## 2021-09-21 RX ORDER — AZITHROMYCIN 500 MG/1
500 TABLET, FILM COATED ORAL DAILY
Qty: 1 PACKET | Refills: 0 | Status: SHIPPED | OUTPATIENT
Start: 2021-09-21 | End: 2021-09-24

## 2021-09-21 RX ORDER — ALBUTEROL SULFATE 90 UG/1
2 AEROSOL, METERED RESPIRATORY (INHALATION) EVERY 6 HOURS PRN
Qty: 18 G | Refills: 0 | Status: SHIPPED | OUTPATIENT
Start: 2021-09-21

## 2021-09-21 RX ADMIN — SERTRALINE 25 MG: 25 TABLET, FILM COATED ORAL at 10:05

## 2021-09-21 RX ADMIN — MULTIPLE VITAMINS W/ MINERALS TAB 1 TABLET: TAB at 10:04

## 2021-09-21 RX ADMIN — METOPROLOL SUCCINATE 25 MG: 25 TABLET, EXTENDED RELEASE ORAL at 10:04

## 2021-09-21 RX ADMIN — IPRATROPIUM BROMIDE AND ALBUTEROL SULFATE 1 AMPULE: .5; 3 SOLUTION RESPIRATORY (INHALATION) at 07:18

## 2021-09-21 RX ADMIN — GUAIFENESIN 600 MG: 600 TABLET, EXTENDED RELEASE ORAL at 10:04

## 2021-09-21 RX ADMIN — FLECAINIDE ACETATE 50 MG: 50 TABLET ORAL at 10:05

## 2021-09-21 RX ADMIN — LISINOPRIL 20 MG: 20 TABLET ORAL at 10:04

## 2021-09-21 ASSESSMENT — PAIN SCALES - GENERAL: PAINLEVEL_OUTOF10: 0

## 2021-09-21 NOTE — PROGRESS NOTES
Report called to Sydni Mcnally RN, all questions answered, verbalized understanding, patient will be transported at 1230.

## 2021-09-21 NOTE — PROGRESS NOTES
Niece Clifford Giovanny was at bedside earlier and is aware of patient being transferred, will notify other family members.

## 2021-09-21 NOTE — FLOWSHEET NOTE
Physical Therapy DailyTreatment Note   Date: 2021 Room: [unfilled]   Name: Bart Lopes : 1919   MRN: [id number]   Admission Date:2021        Rehabilitation Diagnosis: Respiratory syncytial virus (RSV) [B97.4]  Hypoxemia [R09.02]  Delirium [R41.0]  Rhinovirus [B34.8]  KISHA (acute kidney injury) ([facility] [address] [N17.9]  Acute respiratory failure with hypoxemia ([facility] [address] [J96.01]  Community acquired pneumonia, unspecified laterality [J18.9]    Objective                                                                                    Goals:  (Update in navigator)  Short term goals  Time Frame for Short term goals: 1  weekj  Short term goal 1: patient will be Mod I with all bed mobility and transfer activities  Short term goal 2: patient will ambulate 150 ft with RW and mod I:   :        Plan of Care                                                                              Times per week: 4+ days per week for a minimum of 15 minutes/day  Treatment to include Current Treatment Recommendations: Transfer Training, Functional Mobility Training, ADL/Self-care Training, Endurance Training    Date  2021   TIMES IN/OUT   2978/2164    Co-treat w/OT   Restrictions/Precautions Restrictions/Precautions: Contact Precautions, Isolation, Fall Risk         Current Diet/Swallowing Issues ADULT DIET;  Regular   Communication with other providers: [x] Ok to see per nursing at morning huddle  [] Medical hold and reason  [] Spoke with (team    member) regarding   Subjective observations: Patient supine in bed sleeping upon entering, and okay to see per nursing  [x]   Gait belt used during tx session   Pain level/location: Pre-tx  Does not rate  Post-tx  Does not rate   Bed Mobility   Min A of 2     Transfers Sit to stand Min assist  Stand to sit Min assist  Commode Min-Mod assist   Standing Tolerance During amb   Amb/Locomotion: AD/Distance/Assist Pt ambulated 15 feet x2 with Min/CGA of 2 assist using RW            Steps (#)/Assist/Rails/AD n/a   Ramp:AD/Assist n/a   Curb:height AD/Assist n/a   Uneven:type AD/Assist n/a   W/C distance/Assist   n/a   Strategies that improved performance: VC's and tactile cues   Barriers to progress/participation: [place]/confusion   Alarm placed/where? Fall Risk  [ ] left in bed  [x ] left in chair [x ] call light within reach  [ ] bed alarm on  [ ] personal alarm on [ ] [de-identified]  [ ] other staff present:   Discharge recommendations  Anticipated discharge date: expecting discharge  Destination: []home alone   []home alone with assist prn  []Continuous supervision  [x]SNF  [] Assisted living   Continued therapy: []HHC PT  []OUTPATIENT  PT   [] No Further PT  Equipment needs:       Therapeutic activities/exercises completed this date:   Toileting dependent     Modality/intervention used:   [ ] Therapeutic Exercise    [ ] Modalities:   [x ] Therapeutic Activity    [ ] Ultrasound   [ ] Elec Stim   [x ] Gait Training     [ ] Cervical Traction  [ ] Lumbar Traction   [ ] Neuromuscular Re-education   [ ] Cold/hotpack  [ ] Iontophoresis   [ ] Instruction in HEP     [ ] Vasopneumatic   [ ] Manual Therapy   [ ] Aquatic Therapy     Patient/Caregiver Education and Training:      Exercise/Equipment/Modalities this date                       Treatment Plan for Next Session:      Assessment / Impression                                                          Treatment/Activity Tolerance:   [x] Tolerated Treatment well:     [] Patient limited by fatigue/pain:       [] Patient limited by medical complications:    [] Adverse Reaction to Tx:   [] Significant change in status    Plan:   [x ] Continue per plan of care [ ] Gunner Workman current plan   [ ] Plan of care initiated [ ] Hold pending MD visit [ ] Discharged    Billing:  Billed units:  1gt 1ta      Signed: Todd Gregory 9/21/2021, 10:28 AM

## 2021-09-21 NOTE — DISCHARGE SUMMARY
[place]sten [place], [facility] [place]  [place]charge Summary    Gómez Richardson  :  1919  MRN:  [id number]    Admit date:  2021  Discharge date:  2021    Admitting Physician:  Cris Iverson DO    Discharge Diagnoses:    Patient Active Problem List   Diagnosis    Hypertension    [facility]ox[facility]mal [facility] fibrillation) [facility] [address]    Abnormal ECG    [place] (hard of hearing)    Bradycardia    Risk for falls    Irregular heartbeat    Pneumonia of both lower lobes due to infectious organism    RSV (respiratory syncytial virus infection)    Hypoxemia    KISHA (acute kidney injury) ([facility] [address]       Admission Condition:  fair    Discharged Condition:  stable    Hospital Course:     (copied from admission history)  Gómez Richardson is a 80 y. o. who presents to the hospital with a complaint of cough, runny nose, and some very minimal shortness of breath. This is been going on for the last 24 hours. The niece became concerned after the patient started acting a little funny last night, she was brought here to the emergency department, found to have RSV, rhinovirus, and a slight KISHA. Incidental finding of equivocal pneumonia. Patient is hard of hearing, and is not entirely helpful, history mostly comes from the knees. She does deny any chest pain, abdominal pain, nausea or vomiting.    2021-I am seeing her for the first time I have been informed that she is ready for discharge to skilled nursing. She is overall feeling well but needs rehab to ambulate around patient is very hard of hearing. She received Zithromax 500 mg for 3 more days p.o. Stephy David Patient was found to be positive for RSV. Hospital Course:  (copied from last soap) 2021  Encephalopathy (multifactorial) due to medication (bactrim), infection (pneumonia, and UTI ) and lack of sleep, hard o f hearing - IMPROVING - no more visual and auditory hallucinations,  No picking at air. Stephy David dc risperdal     KISHA - baseline creatinine 1.1.  RESOLVED. D/C IV     Viral URI (rhinovirus and RSV)- supportive care     Pneumonia  -  probable viral but can not rule out super-imposed bacterial PNA                  Rocephin/azith, neb, oxygen, mucinex, IS/Accapella;  Improved and is on 98% on room air.    COVID negative.   Repeat CXR  pending and depending on results,  changing to oral antibiotic on 9/20/21     UTI prior to admission - get records from PCP to see if urine cultured.   D/C bactrim.  reculture UA 9/17 and pending.     Hypoxia - RESOLVED with treatment of pneumonia     Anemia chronic disease- monitor and transfuse if hgb under 7     HTN - continue home regimen     P AFIB - stable with flecainide/metoprolol.  CURRENTLY IN NSR      DVT prophylaxis: SCD/ambulate  CODE: FULL    Follow up appointment / plans: Needs to be seen within 7 days by his/her physician, patient to call for an appointment. On examination today  Heart is RRR, Lungs are CTA, poor effort,  abdomen is soft and non tender, CNS is grossly intact. Please see detailed consultation notes and radiology dictations as below. Vitals: Blood pressure (!) 162/68, pulse 67, temperature 96.2 °F (35.7 °C), resp. rate 20, height 4' 7\" (1.397 m), weight 128 lb 4.8 oz (58.2 kg), SpO2 96 %.     Discharge Medications:        Medication List      START taking these medications    albuterol sulfate  (90 Base) MCG/ACT inhaler  Commonly known as: Proventil HFA  Inhale 2 puffs into the lungs every 6 hours as needed for Wheezing     azithromycin 500 MG tablet  Commonly known as: Zithromax  Take 1 tablet by mouth daily for 3 days     guaiFENesin 600 MG extended release tablet  Commonly known as: MUCINEX  Take 1 tablet by mouth 2 times daily        CONTINUE taking these medications    flecainide 50 MG tablet  Commonly known as: TAMBOCOR  Take 1 tablet by mouth 2 times daily     lisinopril 20 MG tablet  Commonly known as: PRINIVIL;ZESTRIL  TAKE ONE TABLET BY MOUTH DAILY     metoprolol succinate Acuity: Acute   Type of Exam: Initial   Additional signs and symptoms: monitor pnuemonia     FINDINGS:   Cardiac mediastinal silhouettes appear stable. Bibasilar infiltrates are   again identified, similar to the previous examination. No acute osseous   abnormality. Degenerative changes in the spine and shoulders. No new   infiltrate is identified. Impression:       Similar appearing bibasilar infiltrates which may represent atelectasis or   pneumonia. CT HEAD WO CONTRAST [[id number]] Collected: 09/16/21 2330     Order Status: Completed Updated: 09/16/21 2346     Narrative:       EXAMINATION:   CT OF THE HEAD WITHOUT CONTRAST  9/16/2021 9:16 pm     TECHNIQUE:   CT of the head was performed without the administration of intravenous   contrast. Dose modulation, iterative reconstruction, and/or weight based   adjustment of the mA/kV was utilized to reduce the radiation dose to as low   as reasonably achievable. COMPARISON:   None. HISTORY:   ORDERING SYSTEM PROVIDED HISTORY: AMS, hallucinations   TECHNOLOGIST PROVIDED HISTORY:   Reason for exam:->AMS, hallucinations   Has a \"code stroke\" or \"stroke alert\" been called? ->No   Decision Support Exception - unselect if not a suspected or confirmed   emergency medical condition->Emergency Medical Condition (MA)   Reason for Exam: cough, AMS   Acuity: Acute   Type of Exam: Initial   Additional signs and symptoms: cough, AMS   Relevant Medical/Surgical History: cough, AMS     FINDINGS:   BRAIN/VENTRICLES: There is no acute intracranial hemorrhage, mass effect or   midline shift. No abnormal extra-axial fluid collection. The gray-white   differentiation is maintained without evidence of an acute infarct. There is   no evidence of hydrocephalus. 15 mm extra-axial ossification is arising from   the inner table of left frontal bone with no significant mass effect on   adjacent brain. Differentials include focal hyperostosis frontalis or   osteoma. Calcified meningioma is less likely. ORBITS: The visualized portion of the orbits demonstrate no acute abnormality. SINUSES: Severe mucosal thickening of the ethmoidal air cells. Mild mucosal   thickening of the frontal maxillary and left sphenoid sinus. The visualized   paranasal sinuses and mastoid air cells demonstrate no other abnormality. SOFT TISSUES/SKULL:  No acute abnormality of the visualized skull or soft   tissues. Impression:       No acute intracranial abnormality. No acute territorial infarction,   intracranial hemorrhage or intraparenchymal mass lesion. 15 mm extra-axial ossification is arising from the inner table of left   frontal bone with no significant mass effect on adjacent brain. Differentials   include focal hyperostosis frontalis or osteoma. Calcified meningioma is less   likely. XR CHEST PORTABLE [[id number]] Collected: 09/16/21 2153     Order Status: Completed Updated: 09/16/21 2156     Narrative:       EXAMINATION:   ONE XRAY VIEW OF THE CHEST     9/16/2021 9:16 pm     COMPARISON:   Chest x-ray May 15, 2021; chest x-ray December 24, 2018     HISTORY:   ORDERING SYSTEM PROVIDED HISTORY: cough, hypoxemia, rattling breath sounds   TECHNOLOGIST PROVIDED HISTORY:   Reason for exam:->cough, hypoxemia, rattling breath sounds   Reason for Exam: cough, AMS   Acuity: Acute   Type of Exam: Initial   Additional signs and symptoms: cough, AMS   Relevant Medical/Surgical History: cough, AMS     FINDINGS:   Cardiac silhouette appears stable. Atherosclerotic changes of the aorta. Chronic interstitial changes of the lungs similar to previous exams. Question mildly increased bibasilar opacities which could reflect atelectasis   versus infiltrate. No pleural effusion. No pneumothorax. Impression:       1. Question mild bibasilar opacities superimposed on a background of chronic   underlying lung changes. Findings could reflect atelectasis versus   infiltrate. Disposition:   SNF  All the above has been explained to patient and or family. Patient would need F/U with his/her PCP in 7 days. Explained that it is the patients responsibility to have blood work or radiology testing done as an out patient. He/She has to take the discharge papers from the hospital for out patient follow up visit with the PCP/Physician. Time spent  >30 minutes. The above chart was partly created by using Dragon dictation system, it may contain dictation errors given the limitations of this technology.      Signed:  Stefano Larose MD MD, FACP  9/21/2021, 9:42 AM

## 2021-09-21 NOTE — PROGRESS NOTES
Occupational Therapy    Occupational Therapy Treatment Note  Name: Alba Howell MRN: [id number] :   1919   Date:  2021   Admission Date: 2021 Room:  [address] [place] [address]   Restrictions/Precautions:  Restrictions/Precautions  Restrictions/Precautions: Contact Precautions, Isolation, Fall Risk     Communication with other providers:   Cleared for treatment by RN. Co treat with PTA. Subjective:  Patient states:  Latoya Limb are you doing this to me? \" pt getting up out of bed and was confused. Pain:   Location, Type, Intensity (0/10 to 10/10): No pain noted    Objective:    Observation:  Pt alert and oriented to self only. Treatment, including education:  Transfers  Supine to sit :MIn A x 2 with max verbal/tactile cues for initiation  Scooting :SBA  Sit to stand :MIn A knee buckling  Stand to sit :CGA  SPT:CGA  Toilet:Min A due to low toilet    Self Care Training:   Activities performed today included the following: Toileting  Dep to complete toilet hygiene and up/down brief. Therapeutic Activity Training: Pt completed functional mobility with RW x 10' x 2 with CGA. Safety Measures: Gait belt used, Left in bed, Pull/Bed Alarm activated and call light left in reach        Assessment / Impression:        Patient's tolerance of treatment: fair   Adverse Reaction: None  Significant change in status and impact:  None  Barriers to improvement:  Dec strength and endurance, confusion    Plan for Next Session:    Continue with POC.     Time in:  845  Time out:  0945  Total treatment time:  30  Billed Units: 1 TA, 1 ADL  Electronically signed by:    LENA Tom 2021, 10:35 AM    Previously filed values:  Patient Goals   Patient goals : Pt wants to return home  Short term goals  Time Frame for Short term goals: until discharge  Short term goal 1: Pt will be CGA for functional transfers to the chair, toilet or bed w/o LOB or falls, following simple 1 step directions using LRD  Short term goal 2: Pt will be min A for UB adls following simple 1 step directions  Short term goal 3: Pt will be Mod A for LB adls following simple 1 step directions  Short term goal 4: Pt will follow 5/10 1 step directions with demonstration and min physical cues

## 2021-09-21 NOTE — PROGRESS NOTES
Patient transferred on stretcher with all belongings by [facility] , skin assessment completed with Shantelle DUDLEY, scattered bruises, moles and age spots, slight vascular wilson, no other skin issues noted.

## 2021-09-22 LAB
CULTURE: NORMAL
CULTURE: NORMAL
Lab: NORMAL
Lab: NORMAL
SPECIMEN: NORMAL
SPECIMEN: NORMAL

## 2021-09-24 NOTE — ADT AUTH CERT
Pneumonia - Care Day 4 (9/20/2021) by Therese Lau RN       Review Status Review Entered   Completed 9/24/2021 15:21      Criteria Review      Care Day: 4 Care Date: 9/20/2021 Level of Care: Telemetry    Guideline Day 2    Level Of Care    (X) Floor    Clinical Status    (X) * No CO2 retention or acidosis    (X) * No requirement for mechanical ventilation    (X) * Hypotension absent    (X) * Afebrile or fever improved    (X) * No hypoxia on room air or oxygenation improved    ( ) * Mental status improved or at baseline    9/24/2021 3:20 PM EDT by Qing Lyle She is intermittently confused (oriented to person but not place).  She received risperdal at 0019 last night.  She is not having hallucinations.     Activity    (X) * Increased activity    9/24/2021 3:20 PM EDT by Elena Curiel      ambulation  Device: [address]: Contact guard assistance  Distance: 25 ft    Routes    (X) Oral hydration    (X) Oral medications    9/24/2021 3:20 PM EDT by Elena Curiel      sertraline 25 mgOralDaily  guaiFENesin 600 mgOralBID    flecainide 50 mgOralBID  lisinopril 20 mgOralDaily  metoprolol succinate 25 mgOralDaily  therapeutic multivitamin-minerals 1 tabletOralDaily    (X) Usual diet    Interventions    (X) Pulse oximetry    9/24/2021 3:21 PM EDT by Elena Curiel      94%    Medications    (X) IV or oral antibiotics    9/24/2021 3:20 PM EDT by Elena Curiel      cefTRIAXone (ROCEPHIN) IV 1,000 [id number]   And  azithromycin 500 [id number]    * Milestone   Additional Notes   9/20      Temp: 97.7 °F (36.5 °C)   Pulse: 72   Resp: 18   BP: (!) 178/72   SpO2: 94 %      Lab   Sodium: 137   Potassium: 4.3   Chloride: 105   CO2: 22   BUN: 25 (H)   Creatinine: 1.0   Anion Gap: 10   GFR Non-: 51 (L)   GFR : >60   Glucose: 95   Calcium: 8.6   WBC: 9.1   RBC: 4.08 (L)   Hemoglobin Quant: 11.3 (L)   Hematocrit: 36.1 (L)   MCV: 88.5   MCH: 27.7   MCHC: Gina Joel RN       Review Status Review Entered   Completed 9/22/2021 07:52      Criteria Review      Care Day: 3 Care Date: 9/19/2021 Level of Care: Telemetry    Guideline Day 2    Clinical Status    ( ) * No CO2 retention or acidosis    ( ) * No requirement for mechanical ventilation    (X) * Hypotension absent    9/22/2021 7:52 AM EDT by Berkley Martinez      /67    (X) * Afebrile or fever improved    9/22/2021 7:52 AM EDT by Berkley Martinez      T 97.5    ( ) * No hypoxia on room air or oxygenation improved    ( ) * Mental status improved or at baseline    Activity    ( ) * Increased activity    Medications    (X) IV or oral antibiotics    9/22/2021 7:52 AM EDT by Berkley Martinez      ZITHROMAX PO    * Milestone   Additional Notes   9/19/2021   MEDICATIONS:   azithromycin (ZITHROMAX) tablet 500 mg    Dose: 500 mg   Freq: EVERY 24 HOURS Route: PO      enoxaparin (LOVENOX) injection 40 mg    Dose: 40 mg   Freq: DAILY Route: SC      flecainide (TAMBOCOR) tablet 50 mg    Dose: 50 mg   Freq: 2 TIMES DAILY Route: PO      guaiFENesin (MUCINEX) extended release tablet 600 mg    Dose: 600 mg   Freq: 2 TIMES DAILY Route: PO      ipratropium-albuterol (DUONEB) nebulizer solution 1 ampule    Dose: 1 ampule   Freq: EVERY 4 HOURS WHILE AWAKE Route: IN      lisinopril (PRINIVIL;ZESTRIL) tablet 20 mg    Dose: 20 mg   Freq: DAILY Route: PO      metoprolol succinate (TOPROL XL) extended release tablet 25 mg    Dose: 25 mg   Freq: DAILY Route: PO      risperiDONE (RISPERDAL) tablet 0.125 mg    Dose: 0.125 mg   Freq: NIGHTLY Route: PO      sertraline (ZOLOFT) tablet 25 mg    Dose: 25 mg   Freq: DAILY Route: PO      therapeutic multivitamin-minerals 1 tablet    Dose: 1 tablet   Freq: DAILY Route: PO      acetaminophen (TYLENOL) tablet 650 mg    Dose: 650 mg   Freq: EVERY 6 HOURS PRN Route: PO X1      * * INTERNAL MEDICINE * *    Assessment and Plan:       Active Hospital Problems     Diagnosis Date Noted   · Pneumonia of both lower lobes due to infectious organism [J18.9] 09/17/2021       Priority: High   · Hypoxemia [R09.02] 09/17/2021       Priority: High   · KISHA (acute kidney injury) ([facility] [address] [N17.9] 09/17/2021       Priority: High   · RSV (respiratory syncytial virus infection) [B97.4] 09/17/2021       Priority: Medium   · [place] (hard of hearing) [H91.90] 10/22/2014   · Hypertension [I10]     · PAF (paroxysmal atrial fibrillation) (HCC) [I48.0]         Encephalopathy (multifactorial) due to medication (bactrim), infection (pneumonia, and UTI ) and lack of sleep, hard o f hearing - IMPROVING - no more visual and auditory hallucinations,  No picking at air. Juliette Talbot dc risperdal       KISHA - baseline creatinine 1. 1.  RESOLVED. D/C IV       Viral URI (rhinovirus and RSV)- supportive care       Pneumonia  -  probable viral but can not rule out super-imposed bacterial PNA                   Rocephin/azith, neb, oxygen, mucinex, IS/Accapella;  Improved and is on 98% on room air.    COVID negative.   Repeat CXR  pending and depending on results,  changing to oral antibiotic on 9/20/21       UTI prior to admission - get records from PCP to see if urine cultured.   D/C bactrim.  reculture UA 9/17 and pending.       Hypoxia - RESOLVED with treatment of pneumonia       Anemia chronic disease- monitor and transfuse if hgb under 7       HTN - continue home regimen       P AFIB - stable with flecainide/metoprolol.  CURRENTLY IN NSR        DVT prophylaxis: SCD/ambulate   CODE: FULL    Disposition - await eval by PT to determine if debility and SWING or home with family

## 2022-04-06 ENCOUNTER — HOSPITAL ENCOUNTER (OUTPATIENT)
Age: 87
Setting detail: SPECIMEN
Discharge: HOME | End: 2022-04-06
Payer: MEDICARE

## 2022-04-06 PROCEDURE — 86480 TB TEST CELL IMMUN MEASURE: CPT

## 2022-04-06 PROCEDURE — 36415 COLL VENOUS BLD VENIPUNCTURE: CPT

## 2022-04-09 LAB
QUANTI TB1 MINUS NIL: 0 IU/ML (ref 0–0.34)
QUANTI TB2 MINUS NIL: 0 IU/ML (ref 0–0.34)
QUANTIFERON (R) TB GOLD (INCUBATED): NEGATIVE IU/ML
QUANTIFERON MITOGEN MINUS NIL: >10 IU/ML
QUANTIFERON NIL: 0.02 IU/ML

## 2023-07-04 ENCOUNTER — HOSPITAL ENCOUNTER (INPATIENT)
Age: 88
LOS: 9 days | Discharge: NURSING, REHAB OR LONG-TERM CARE | End: 2023-07-14
Attending: EMERGENCY MEDICINE | Admitting: INTERNAL MEDICINE
Payer: COMMERCIAL

## 2023-07-04 DIAGNOSIS — S72.001A CLOSED FRACTURE OF NECK OF RIGHT FEMUR, INITIAL ENCOUNTER (HCC): Primary | ICD-10-CM

## 2023-07-04 PROCEDURE — 99285 EMERGENCY DEPT VISIT HI MDM: CPT

## 2023-07-05 ENCOUNTER — APPOINTMENT (OUTPATIENT)
Dept: GENERAL RADIOLOGY | Age: 88
End: 2023-07-05
Payer: COMMERCIAL

## 2023-07-05 ENCOUNTER — APPOINTMENT (OUTPATIENT)
Dept: CT IMAGING | Age: 88
End: 2023-07-05
Payer: COMMERCIAL

## 2023-07-05 PROBLEM — S72.044A NONDISPLACED FRACTURE OF BASE OF NECK OF RIGHT FEMUR, INITIAL ENCOUNTER FOR CLOSED FRACTURE (HCC): Status: ACTIVE | Noted: 2023-07-05

## 2023-07-05 LAB
ANION GAP SERPL CALCULATED.3IONS-SCNC: 10 MMOL/L (ref 4–16)
BASOPHILS ABSOLUTE: 0 K/CU MM
BASOPHILS RELATIVE PERCENT: 0.4 % (ref 0–1)
BUN SERPL-MCNC: 36 MG/DL (ref 6–23)
CALCIUM SERPL-MCNC: 8.4 MG/DL (ref 8.3–10.6)
CHLORIDE BLD-SCNC: 105 MMOL/L (ref 99–110)
CO2: 26 MMOL/L (ref 21–32)
CREAT SERPL-MCNC: 0.9 MG/DL (ref 0.6–1.1)
DIFFERENTIAL TYPE: ABNORMAL
EKG ATRIAL RATE: 67 BPM
EKG DIAGNOSIS: NORMAL
EKG P AXIS: 5 DEGREES
EKG P-R INTERVAL: 228 MS
EKG Q-T INTERVAL: 408 MS
EKG QRS DURATION: 126 MS
EKG QTC CALCULATION (BAZETT): 431 MS
EKG R AXIS: -17 DEGREES
EKG T AXIS: 76 DEGREES
EKG VENTRICULAR RATE: 67 BPM
EOSINOPHILS ABSOLUTE: 0.3 K/CU MM
EOSINOPHILS RELATIVE PERCENT: 3.2 % (ref 0–3)
GFR SERPL CREATININE-BSD FRML MDRD: 56 ML/MIN/1.73M2
GLUCOSE SERPL-MCNC: 100 MG/DL (ref 70–99)
HCT VFR BLD CALC: 33 % (ref 37–47)
HEMOGLOBIN: 10.8 GM/DL (ref 12.5–16)
IMMATURE NEUTROPHIL %: 0.6 % (ref 0–0.43)
LYMPHOCYTES ABSOLUTE: 2.2 K/CU MM
LYMPHOCYTES RELATIVE PERCENT: 24.2 % (ref 24–44)
MCH RBC QN AUTO: 29 PG (ref 27–31)
MCHC RBC AUTO-ENTMCNC: 32.7 % (ref 32–36)
MCV RBC AUTO: 88.5 FL (ref 78–100)
MONOCYTES ABSOLUTE: 1.2 K/CU MM
MONOCYTES RELATIVE PERCENT: 13.7 % (ref 0–4)
NUCLEATED RBC %: 0 %
PDW BLD-RTO: 14.2 % (ref 11.7–14.9)
PLATELET # BLD: 169 K/CU MM (ref 140–440)
PMV BLD AUTO: 10 FL (ref 7.5–11.1)
POTASSIUM SERPL-SCNC: 4.6 MMOL/L (ref 3.5–5.1)
RBC # BLD: 3.73 M/CU MM (ref 4.2–5.4)
SEGMENTED NEUTROPHILS ABSOLUTE COUNT: 5.2 K/CU MM
SEGMENTED NEUTROPHILS RELATIVE PERCENT: 57.9 % (ref 36–66)
SODIUM BLD-SCNC: 141 MMOL/L (ref 135–145)
TOTAL IMMATURE NEUTOROPHIL: 0.05 K/CU MM
TOTAL NUCLEATED RBC: 0 K/CU MM
WBC # BLD: 9 K/CU MM (ref 4–10.5)

## 2023-07-05 PROCEDURE — 94761 N-INVAS EAR/PLS OXIMETRY MLT: CPT

## 2023-07-05 PROCEDURE — 2700000000 HC OXYGEN THERAPY PER DAY

## 2023-07-05 PROCEDURE — 6370000000 HC RX 637 (ALT 250 FOR IP): Performed by: INTERNAL MEDICINE

## 2023-07-05 PROCEDURE — 73502 X-RAY EXAM HIP UNI 2-3 VIEWS: CPT

## 2023-07-05 PROCEDURE — 93005 ELECTROCARDIOGRAM TRACING: CPT | Performed by: INTERNAL MEDICINE

## 2023-07-05 PROCEDURE — 6360000002 HC RX W HCPCS: Performed by: INTERNAL MEDICINE

## 2023-07-05 PROCEDURE — 93010 ELECTROCARDIOGRAM REPORT: CPT | Performed by: INTERNAL MEDICINE

## 2023-07-05 PROCEDURE — 1200000000 HC SEMI PRIVATE

## 2023-07-05 PROCEDURE — 2580000003 HC RX 258: Performed by: INTERNAL MEDICINE

## 2023-07-05 PROCEDURE — 85025 COMPLETE CBC W/AUTO DIFF WBC: CPT

## 2023-07-05 PROCEDURE — 80048 BASIC METABOLIC PNL TOTAL CA: CPT

## 2023-07-05 PROCEDURE — 99223 1ST HOSP IP/OBS HIGH 75: CPT | Performed by: ORTHOPAEDIC SURGERY

## 2023-07-05 PROCEDURE — 72192 CT PELVIS W/O DYE: CPT

## 2023-07-05 RX ORDER — CALCIUM CARBONATE 500 MG/1
1 TABLET, CHEWABLE ORAL 4 TIMES DAILY PRN
COMMUNITY

## 2023-07-05 RX ORDER — DIVALPROEX SODIUM 250 MG/1
250 TABLET, DELAYED RELEASE ORAL NIGHTLY
Status: DISCONTINUED | OUTPATIENT
Start: 2023-07-05 | End: 2023-07-14 | Stop reason: HOSPADM

## 2023-07-05 RX ORDER — TRAZODONE HYDROCHLORIDE 50 MG/1
25 TABLET ORAL NIGHTLY
COMMUNITY

## 2023-07-05 RX ORDER — THIAMINE HYDROCHLORIDE 100 MG/ML
100 INJECTION, SOLUTION INTRAMUSCULAR; INTRAVENOUS DAILY
Status: DISCONTINUED | OUTPATIENT
Start: 2023-07-05 | End: 2023-07-13

## 2023-07-05 RX ORDER — SODIUM CHLORIDE 0.9 % (FLUSH) 0.9 %
5-40 SYRINGE (ML) INJECTION EVERY 12 HOURS SCHEDULED
Status: DISCONTINUED | OUTPATIENT
Start: 2023-07-05 | End: 2023-07-14 | Stop reason: HOSPADM

## 2023-07-05 RX ORDER — SODIUM CHLORIDE 9 MG/ML
INJECTION, SOLUTION INTRAVENOUS PRN
Status: DISCONTINUED | OUTPATIENT
Start: 2023-07-05 | End: 2023-07-14 | Stop reason: HOSPADM

## 2023-07-05 RX ORDER — ENOXAPARIN SODIUM 100 MG/ML
30 INJECTION SUBCUTANEOUS DAILY
Status: DISCONTINUED | OUTPATIENT
Start: 2023-07-05 | End: 2023-07-14 | Stop reason: HOSPADM

## 2023-07-05 RX ORDER — POLYVINYL ALCOHOL 14 MG/ML
1 SOLUTION/ DROPS OPHTHALMIC EVERY 4 HOURS PRN
Status: DISCONTINUED | OUTPATIENT
Start: 2023-07-05 | End: 2023-07-14 | Stop reason: HOSPADM

## 2023-07-05 RX ORDER — POLYETHYLENE GLYCOL 3350 17 G/17G
17 POWDER, FOR SOLUTION ORAL DAILY PRN
Status: DISCONTINUED | OUTPATIENT
Start: 2023-07-05 | End: 2023-07-14 | Stop reason: HOSPADM

## 2023-07-05 RX ORDER — ONDANSETRON 2 MG/ML
4 INJECTION INTRAMUSCULAR; INTRAVENOUS EVERY 6 HOURS PRN
Status: DISCONTINUED | OUTPATIENT
Start: 2023-07-05 | End: 2023-07-14 | Stop reason: HOSPADM

## 2023-07-05 RX ORDER — ACETAMINOPHEN 650 MG/1
650 SUPPOSITORY RECTAL EVERY 6 HOURS PRN
Status: DISCONTINUED | OUTPATIENT
Start: 2023-07-05 | End: 2023-07-14 | Stop reason: HOSPADM

## 2023-07-05 RX ORDER — LISINOPRIL 5 MG/1
10 TABLET ORAL DAILY
Status: DISCONTINUED | OUTPATIENT
Start: 2023-07-05 | End: 2023-07-14 | Stop reason: HOSPADM

## 2023-07-05 RX ORDER — LANOLIN ALCOHOL/MO/W.PET/CERES
3 CREAM (GRAM) TOPICAL NIGHTLY
Status: DISCONTINUED | OUTPATIENT
Start: 2023-07-05 | End: 2023-07-14 | Stop reason: HOSPADM

## 2023-07-05 RX ORDER — DIVALPROEX SODIUM 250 MG/1
250 TABLET, EXTENDED RELEASE ORAL NIGHTLY
COMMUNITY

## 2023-07-05 RX ORDER — CALCIUM CARBONATE 500 MG/1
1 TABLET, CHEWABLE ORAL 4 TIMES DAILY PRN
Status: DISCONTINUED | OUTPATIENT
Start: 2023-07-05 | End: 2023-07-14 | Stop reason: HOSPADM

## 2023-07-05 RX ORDER — ACETAMINOPHEN 325 MG/1
650 TABLET ORAL EVERY 6 HOURS PRN
Status: DISCONTINUED | OUTPATIENT
Start: 2023-07-05 | End: 2023-07-14 | Stop reason: HOSPADM

## 2023-07-05 RX ORDER — FLECAINIDE ACETATE 50 MG/1
50 TABLET ORAL 2 TIMES DAILY
Status: DISCONTINUED | OUTPATIENT
Start: 2023-07-05 | End: 2023-07-14 | Stop reason: HOSPADM

## 2023-07-05 RX ORDER — SODIUM CHLORIDE 0.9 % (FLUSH) 0.9 %
5-40 SYRINGE (ML) INJECTION PRN
Status: DISCONTINUED | OUTPATIENT
Start: 2023-07-05 | End: 2023-07-14 | Stop reason: HOSPADM

## 2023-07-05 RX ORDER — ONDANSETRON 4 MG/1
4 TABLET, ORALLY DISINTEGRATING ORAL EVERY 8 HOURS PRN
Status: DISCONTINUED | OUTPATIENT
Start: 2023-07-05 | End: 2023-07-14 | Stop reason: HOSPADM

## 2023-07-05 RX ORDER — HYDRALAZINE HYDROCHLORIDE 20 MG/ML
5 INJECTION INTRAMUSCULAR; INTRAVENOUS EVERY 6 HOURS PRN
Status: DISCONTINUED | OUTPATIENT
Start: 2023-07-05 | End: 2023-07-14 | Stop reason: HOSPADM

## 2023-07-05 RX ADMIN — MELATONIN TAB 3 MG 3 MG: 3 TAB at 20:19

## 2023-07-05 RX ADMIN — METOPROLOL TARTRATE 12.5 MG: 25 TABLET, FILM COATED ORAL at 20:20

## 2023-07-05 RX ADMIN — DIVALPROEX SODIUM 250 MG: 250 TABLET, DELAYED RELEASE ORAL at 20:20

## 2023-07-05 RX ADMIN — ENOXAPARIN SODIUM 30 MG: 100 INJECTION SUBCUTANEOUS at 10:42

## 2023-07-05 RX ADMIN — SODIUM CHLORIDE, PRESERVATIVE FREE 10 ML: 5 INJECTION INTRAVENOUS at 10:45

## 2023-07-05 RX ADMIN — FLECAINIDE ACETATE 50 MG: 50 TABLET ORAL at 20:20

## 2023-07-05 RX ADMIN — SODIUM CHLORIDE, PRESERVATIVE FREE 10 ML: 5 INJECTION INTRAVENOUS at 20:28

## 2023-07-05 RX ADMIN — HYDRALAZINE HYDROCHLORIDE 5 MG: 20 INJECTION INTRAMUSCULAR; INTRAVENOUS at 15:42

## 2023-07-05 RX ADMIN — THIAMINE HYDROCHLORIDE 100 MG: 100 INJECTION, SOLUTION INTRAMUSCULAR; INTRAVENOUS at 10:42

## 2023-07-05 RX ADMIN — METOPROLOL TARTRATE 12.5 MG: 25 TABLET, FILM COATED ORAL at 10:32

## 2023-07-05 RX ADMIN — LISINOPRIL 10 MG: 5 TABLET ORAL at 10:32

## 2023-07-05 RX ADMIN — FLECAINIDE ACETATE 50 MG: 50 TABLET ORAL at 10:44

## 2023-07-05 NOTE — PROGRESS NOTES
07/05/23 1043   Encounter Summary   Encounter Overview/Reason  Initial Encounter; Encounter   Service Provided For: Patient   Referral/Consult From: [address] Family members   Last Encounter  07/05/23  Dionte Montero: Patient was able to discuss her amada as Cheondoism and needed Sacrament of the Sick and [employer].  I prayed for her, administered the Sacrament of the Sick and also gave her Holy Communion.)   Complexity of Encounter Low   Begin Time 1030   End Time  1046   Total Time Calculated 16 min   Encounter    Type Initial Screen/Assessment   Spiritual/Emotional needs   Type Spiritual Support   Rituals, Rites and Sacraments   Type Sacrament of Sick;Cheondoism Communion   Assessment/Intervention/Outcome   Assessment Peaceful;Coping   Intervention Active listening;Nurtured Hope;Prayer (assurance of)/[place]   Outcome Comfort;Engaged in conversation;Encouraged;Peace   Plan and Referrals   Plan/Referrals Continue to visit, (comment)

## 2023-07-05 NOTE — CONSULTS
Orthopaedic Consult    Patient Name: Radha Taylor   ([birth year])  MRN   [id number]   Today's date:  7/5/2023     CHIEF COMPLAINT: Right hip    HISTORY OF PRESENT ILLNESS:      The patient is a 80 y.o. female  who presents with right hip pain. Patient is a nursing facility. She has dementia and is a poor historian. Patient reportedly had a fall last week and had imaging done earlier today showing right hip fracture. She was sent to [facility] ED from her nursing facility but was subsequently transferred here per family request for further evaluation. Patient is a DNR CC and has baseline dementia. She is unable to contribute to history of present illness. From prior fall, patient also had a right forearm fracture and is currently in a splint. No other apparent acute issues noted at this time on patient arrival.  At baseline, patient ambulates with a walker. She does not complain of any pain at rest.      Past Medical History         Diagnosis Date    Bradycardia 5/22/2017    Glaucoma     H/O cardiovascular stress test 4/14/2011    normal pattern of perfusion in all regions, ef 70    H/O echocardiogram 4/14/2011    mild concentric lvh with preserved systolic and abnormal diastolic fxn ZJ>16    [place] (hard of hearing) 10/22/2014    Hypertension     PAF (paroxysmal atrial fibrillation) ([address])     resolved on Flecainide    Risk for falls 5/22/2017    Thyroid disease        Past Surgical History         Procedure Laterality Date    HYSTERECTOMY         Medications Prior to Admission:     Prior to Admission medications    Medication Sig Start Date End Date Taking?  Authorizing Provider   carboxymethylcellulose 1 % ophthalmic solution Place 1 drop into both eyes every 4 hours as needed for Dry Eyes   Yes Historical Provider, MD   calcium carbonate (TUMS) 500 MG chewable tablet Take 1 tablet by mouth 4 times daily as needed for Heartburn   Yes Historical

## 2023-07-05 NOTE — PROGRESS NOTES
07/05/23 1245   Encounter Summary   Encounter Overview/Reason  Initial Encounter   Service Provided For: Patient   Referral/Consult From: [address] Family members   Last Encounter  07/05/23  (attempted visit of EUcharistic  charted per  Criso)   Complexity of Encounter Low   Begin Time 0930   End Time  0935   Total Time Calculated 5 min   Encounter    Type Follow up   Assessment/Intervention/Outcome   Assessment Unable to assess   Plan and Referrals   Plan/Referrals Continue to visit, (comment)

## 2023-07-05 NOTE — CARE COORDINATION
From [facility], can return LTC anytime, or three day stay needed for SNF stay. Sumaya Valencia at [facility] checking to ensure has SNF benefit days left. 07/05/23 0943   Service Assessment   Patient Orientation Alert and Oriented   Cognition Alert   History Provided By Patient   Primary [address] Family Members   Patient's Healthcare Decision Maker is: Patient Declined (Legal Next of Kin Remains as Decision Maker)   PCP Verified by CM Yes   Last Visit to PCP Within last 3 months   Prior Functional Level Assistance with the following:   Current Functional Level Assistance with the following:   Can patient return to prior living arrangement Unknown at present   Ability to make needs known: Fair   Family able to assist with home care needs: Yes   Would you like for me to discuss the discharge plan with any other family members/significant others, and if so, who?  No   Financial Resources None   Social/Functional History   Type of Home Facility   Discharge Planning   Type of Residence Skilled Nursing Facility   Living Arrangements Alone   Potential Assistance Needed Skilled Nursing Facility   Type of Home Care Services None

## 2023-07-05 NOTE — PROGRESS NOTES
..4 Eyes Skin Assessment     NAME:  Stanley Trinh  YOB: 1919  MEDICAL RECORD NUMBER:  [id number]    The patient is being assessed for  Admission    I agree that at least one RN has performed a thorough Head to Toe Skin Assessment on the patient. ALL assessment sites listed below have been assessed. Areas assessed by both nurses:    Head, Face, Ears, Shoulders, Back, Chest, Arms, Elbows, Hands, Sacrum. Buttock, Coccyx, Ischium, Legs. Feet and Heels, and Under Medical Devices     Erythema located on bilateral upper extremities        Does the Patient have a Wound?  No noted wound(s)       Rey Prevention initiated by RN: Yes  Wound Care Orders initiated by RN: No    Pressure Injury (Stage 3,4, Unstageable, DTI, NWPT, and Complex wounds) if present, place Wound referral order by RN under : No    New Ostomies, if present place, Ostomy referral order under : No     Nurse 1 eSignature: Electronically signed by Solomon Dietz LPN on 8/9/55 at 8:97 AM EDT    **SHARE this note so that the co-signing nurse can place an eSignature**    Nurse 2 eSignature: Electronically signed by Farida Eric RN on 7/5/23 at 6:57 AM EDT

## 2023-07-05 NOTE — ED PROVIDER NOTES
Triage Chief Complaint:   Fall (Transfer from [place])    [place]:  David Calzada is a 80 y.o. female that presents from [facility], ED for right hip fracture. Patient reportedly had a fall last week and had imaging done earlier today showing right hip fracture. She was sent to [facility] ED from her nursing facility but was subsequently transferred here per family request for further evaluation. Patient is a DNR CC and has baseline dementia. She is unable to contribute to history of present illness. From prior fall, patient also had a right forearm fracture and is currently in a splint. No other apparent acute issues noted at this time on patient arrival.  At baseline, patient ambulates with a walker. ROS:  Unable to fully obtain    Past Medical History:   Diagnosis Date    Bradycardia 5/22/2017    Glaucoma     H/O cardiovascular stress test 4/14/2011    normal pattern of perfusion in all regions, ef 70    H/O echocardiogram 4/14/2011    mild concentric lvh with preserved systolic and abnormal diastolic fxn FE>11    [place] (hard of hearing) 10/22/2014    Hypertension     PAF (paroxysmal atrial fibrillation) ([address])     resolved on Flecainide    Risk for falls 5/22/2017    Thyroid disease      Past Surgical History:   Procedure Laterality Date    HYSTERECTOMY       Family History   Problem Relation Age of Onset    Kidney Disease Mother     Stroke Father     Heart Disease Sister     High Blood Pressure Sister     Heart Disease Brother     High Blood Pressure Brother     Cancer Brother     Cancer Sister     Cancer Sister      Social History     Socioeconomic History    Marital status:       Spouse name: Not on file    Number of children: 0    Years of education: Not on file    Highest education level: Not on file   Occupational History    Occupation: volunteer   Tobacco Use    Smoking status: Never    Smokeless tobacco: Never   Vaping Use    Vaping Use: Never used   Substance and

## 2023-07-05 NOTE — H&P
History and Physical      Name:  Travis Vital /Age/Sex: 1919  (80 y.o. female)   MRN & CSN:  [id number] & [id number] Encounter Date/Time: 2023 2:12 AM EDT   Location:  ED30/ED-30 PCP: Heather Cordero MD       Hospital Day: 2    Assessment and Plan:     #. Acute nondisplaced right femoral neck fracture  -Patient had a fall at the nursing home 2023.  -Patient usually ambulates with a walker, was unable to ambulate which prompted further imaging.  -Orthopedic surgeon-Dr. Annika Woods consulted from ED.  -Patient's POA-to discuss with orthopedic surgeon in a.m. regarding treatment options.  -Continue strict bedrest  -Analgesics as needed. #.  Closed intra-articular fracture of distal end of right radius-2023  -Patient has a splint in place  -Visible fingers edematous, sensation intact  -CT head, CT C-spine-2023-no acute process. Probable calcified meningioma left frontal region measuring 13 mm. #.  Compression fractures  -As per Care Everywhere-patient had lumbar x-ray which revealed compression fracture of the body of thoracic vertebra, L1 vertebra, L2 vertebra-age indeterminate. #.  Paroxysmal atrial fibrillation  -Patient is on flecainide, not on anticoagulation    #. Hypertension  -Patient is on lisinopril, metoprolol tartrate  -Resume home medications with holding parameters  -IV hydralazine as needed for SBP>160    #. Chronic normocytic anemia  -Monitor with repeat H&H    #. Hearing deficit    #. Senile dementia  -Patient knows her name, knew her date of birth, did not know that she is in the hospital.  But was able to recall her niece and nephew's name.  -Patient is on Depakote  -As per paperwork sent from nursing home patient was recently prescribed Ativan 0.5 mg every 6 hours as needed, Haldol every 6 hours as needed, trazodone-hold given patient's age  -Delirium precautions  -Melatonin ordered.     #.  Patient is currently enrolled into hospice-[facility]

## 2023-07-05 NOTE — DISCHARGE INSTR - COC
Continuity of Care Form    Patient Name: Cali Thompson   :  1919  MRN:  [id number]    Admit date:  2023  Discharge date:  23    Code Status Order: DNR-CC   Advance Directives:     Admitting Physician:  Leif Kim MD  PCP: Glen Reynolds MD    Discharging Nurse: [facility] Unit/Room#: 1116/1116-A  Discharging Unit Phone Number: [phone]    Emergency Contact:   Extended Emergency Contact Information  Primary Emergency Contact: Tete Santana  Address: Bhargav Sigala, [address] [place] [address] of [address] Phone: [phone]  Mobile Phone: [phone]  Relation: Niece/Nephew  Secondary Emergency Contact: [address] [place] [address]  Phone: [phone]  Relation: Niece/Nephew    Past Surgical History:  Past Surgical History:   Procedure Laterality Date    HYSTERECTOMY         Immunization History:   Immunization History   Administered Date(s) Administered    Influenza, FLUBLOK, (age 25 y+), PF, 0.5mL 10/06/2020    Influenza, High Dose (Fluzone 65 yrs and older) 10/21/2019       Active Problems:  Patient Active Problem List   Diagnosis Code    Hypertension I10    PAF (paroxysmal atrial fibrillation) (HCC) I48.0    Abnormal ECG R94.31    [place] (hard of hearing) H91.90    Bradycardia R00.1    Risk for falls Z91.81    Irregular heartbeat I49.9    Pneumonia of both lower lobes due to infectious organism J18.9    RSV (respiratory syncytial virus infection) B33.8    Hypoxemia R09.02    KISHA (acute kidney injury) ([address]) N17.9    Nondisplaced fracture of base of neck of right femur, initial encounter for closed fracture ([address]) S72.044A       Isolation/Infection:   Isolation            No Isolation          Patient Infection Status       Infection Onset Added Last Indicated Last Indicated By Review Planned Expiration Resolved Resolved By    None active    Resolved    COVID-19 (Rule Out) 21 Respiratory Panel, Molecular, with COVID-19

## 2023-07-06 ENCOUNTER — NURSE TRIAGE (OUTPATIENT)
Dept: OTHER | Facility: CLINIC | Age: 88
End: 2023-07-06

## 2023-07-06 LAB
ALBUMIN SERPL-MCNC: 3.3 GM/DL (ref 3.4–5)
ALP BLD-CCNC: 61 IU/L (ref 40–128)
ALT SERPL-CCNC: 9 U/L (ref 10–40)
ANION GAP SERPL CALCULATED.3IONS-SCNC: 13 MMOL/L (ref 4–16)
AST SERPL-CCNC: 17 IU/L (ref 15–37)
BASOPHILS ABSOLUTE: 0 K/CU MM
BASOPHILS RELATIVE PERCENT: 0.4 % (ref 0–1)
BILIRUB SERPL-MCNC: 0.8 MG/DL (ref 0–1)
BUN SERPL-MCNC: 33 MG/DL (ref 6–23)
CALCIUM SERPL-MCNC: 8.5 MG/DL (ref 8.3–10.6)
CHLORIDE BLD-SCNC: 100 MMOL/L (ref 99–110)
CO2: 23 MMOL/L (ref 21–32)
CREAT SERPL-MCNC: 0.8 MG/DL (ref 0.6–1.1)
DIFFERENTIAL TYPE: ABNORMAL
EOSINOPHILS ABSOLUTE: 0.1 K/CU MM
EOSINOPHILS RELATIVE PERCENT: 1.5 % (ref 0–3)
GFR SERPL CREATININE-BSD FRML MDRD: >60 ML/MIN/1.73M2
GLUCOSE SERPL-MCNC: 84 MG/DL (ref 70–99)
HCT VFR BLD CALC: 33.2 % (ref 37–47)
HEMOGLOBIN: 10.7 GM/DL (ref 12.5–16)
IMMATURE NEUTROPHIL %: 0.8 % (ref 0–0.43)
LYMPHOCYTES ABSOLUTE: 1.5 K/CU MM
LYMPHOCYTES RELATIVE PERCENT: 20.4 % (ref 24–44)
MCH RBC QN AUTO: 29.2 PG (ref 27–31)
MCHC RBC AUTO-ENTMCNC: 32.2 % (ref 32–36)
MCV RBC AUTO: 90.7 FL (ref 78–100)
MONOCYTES ABSOLUTE: 0.8 K/CU MM
MONOCYTES RELATIVE PERCENT: 10.8 % (ref 0–4)
NUCLEATED RBC %: 0 %
PDW BLD-RTO: 14.1 % (ref 11.7–14.9)
PLATELET # BLD: 228 K/CU MM (ref 140–440)
PMV BLD AUTO: 9.4 FL (ref 7.5–11.1)
POTASSIUM SERPL-SCNC: 4.2 MMOL/L (ref 3.5–5.1)
RBC # BLD: 3.66 M/CU MM (ref 4.2–5.4)
SEGMENTED NEUTROPHILS ABSOLUTE COUNT: 4.7 K/CU MM
SEGMENTED NEUTROPHILS RELATIVE PERCENT: 66.1 % (ref 36–66)
SODIUM BLD-SCNC: 136 MMOL/L (ref 135–145)
TOTAL IMMATURE NEUTOROPHIL: 0.06 K/CU MM
TOTAL NUCLEATED RBC: 0 K/CU MM
TOTAL PROTEIN: 6.8 GM/DL (ref 6.4–8.2)
WBC # BLD: 7.2 K/CU MM (ref 4–10.5)

## 2023-07-06 PROCEDURE — 97530 THERAPEUTIC ACTIVITIES: CPT

## 2023-07-06 PROCEDURE — 6360000002 HC RX W HCPCS: Performed by: INTERNAL MEDICINE

## 2023-07-06 PROCEDURE — 6370000000 HC RX 637 (ALT 250 FOR IP): Performed by: INTERNAL MEDICINE

## 2023-07-06 PROCEDURE — 80053 COMPREHEN METABOLIC PANEL: CPT

## 2023-07-06 PROCEDURE — 2580000003 HC RX 258: Performed by: INTERNAL MEDICINE

## 2023-07-06 PROCEDURE — 94761 N-INVAS EAR/PLS OXIMETRY MLT: CPT

## 2023-07-06 PROCEDURE — 36415 COLL VENOUS BLD VENIPUNCTURE: CPT

## 2023-07-06 PROCEDURE — 1200000000 HC SEMI PRIVATE

## 2023-07-06 PROCEDURE — 97166 OT EVAL MOD COMPLEX 45 MIN: CPT

## 2023-07-06 PROCEDURE — 97162 PT EVAL MOD COMPLEX 30 MIN: CPT

## 2023-07-06 PROCEDURE — 85025 COMPLETE CBC W/AUTO DIFF WBC: CPT

## 2023-07-06 RX ADMIN — SODIUM CHLORIDE, PRESERVATIVE FREE 10 ML: 5 INJECTION INTRAVENOUS at 10:08

## 2023-07-06 RX ADMIN — ENOXAPARIN SODIUM 30 MG: 100 INJECTION SUBCUTANEOUS at 10:07

## 2023-07-06 RX ADMIN — THIAMINE HYDROCHLORIDE 100 MG: 100 INJECTION, SOLUTION INTRAMUSCULAR; INTRAVENOUS at 10:07

## 2023-07-06 RX ADMIN — FLECAINIDE ACETATE 50 MG: 50 TABLET ORAL at 10:06

## 2023-07-06 RX ADMIN — METOPROLOL TARTRATE 12.5 MG: 25 TABLET, FILM COATED ORAL at 10:05

## 2023-07-06 RX ADMIN — LISINOPRIL 10 MG: 5 TABLET ORAL at 10:05

## 2023-07-06 NOTE — PROGRESS NOTES
Occupational Therapy    [facility] ACUTE CARE OCCUPATIONAL THERAPY EVALUATION    Medardo Che, 5/12/1919, 1116/1116-A, 7/6/2023    Discharge Recommendation: [address]    History:  [place]:  The encounter diagnosis was Closed fracture of neck of right femur, initial encounter ([address] Subjective:  Patient states: Kavitha Mantle you bring me in a big shot of whiskey? \"   Pain: Pt denied pain this date  Communication with other providers: PT Yogesh President, RN Srvaanthi Ribeiro  Restrictions: General Precautions, Fall Risk, NWB Rt UE and Rt LE, Telemetry, Pulse Ox, BP cuff, Bed/chair alarm    Home Setup/Prior level of function:  Social/Functional History  Type of Home: Facility (LTC at [facility]; pt reports she is independent with ADLs and ambulates without an AD at baseline. Pt was not a fully reliable historian however. Should confirm with facility)    Examination:  Observation: Supine in bed upon arrival. Pleasant and agreeable to evaluation. Very appreciative of therapy services and repeatedly saying \"Thank you for coming. \"  Vision: WFL (Glasses)  Hearing: [place] (has hearing aid)  Vitals: Stable vitals throughout session on room air    Body Systems and functions:  ROM: Lt UE WFL all joints, Rt shoulder and elbow grossly WFL, Rt UE not assessed at wrist and fingers due to splint  Strength: Lt UE 4/5 MMT all major muscle groups, Rt UE not assessed due to NWB status  Sensation: WFL (denies numbness/tingling)  Tone: Normal  Coordination: WFL in Lt hand    Activities of Daily Living (ADLs):  Feeding: Supervision/setup (using non-dominant Lt hand; setup for cutting up food and opening packages/containers)  Grooming: SBA (seated facial hygiene task EOB using Lt hand; not feasible to complete in standing due to NWB status Rt LE)  UB bathing: Min A (for washing Lt arm due to minimal functional Rt UE use)  LB bathing: Dependent   UB dressing: Max A (assist for threading Rt arm through sleeve and overhead mgmt)  LB dressing:

## 2023-07-06 NOTE — PROGRESS NOTES
Physical Therapy  [facility] ACUTE CARE PHYSICAL THERAPY EVALUATION  Medardo Che, 5/12/1919, 1116/1116-A, 7/6/2023    History  [place]:  The encounter diagnosis was Closed fracture of neck of right femur, initial encounter ([address]). Patient  has a past medical history of Bradycardia, Glaucoma, H/O cardiovascular stress test, H/O echocardiogram, [place] (hard of hearing), Hypertension, PAF (paroxysmal atrial fibrillation) ([address]), Risk for falls, and Thyroid disease. Patient  has a past surgical history that includes Hysterectomy. Subjective:  Patient states:  \"Just put me in the street and let me walk\"   Pain:  denies   Communication with other providers:  Handoff to RN, co-eval with Whitney SHANKS   Restrictions: NWB RUE and NWB RLE, gentle ROM to RLE. Home Setup/Prior level of function  Social/Functional History  Type of Home: Facility (LTC)  Additional Comments: Pt is a questionable historian. Reports she was able to ambulate and perform ADLs. Should confirm with facility. Examination of body systems (includes body structures/functions, activity/participation limitations):  Observation:  Supine in bed upon arrival. Cooperative with therapy. Vision:  impaired, glasses  Hearing:  [place], hearing aids   Cardiopulmonary:  stable vitals on room air   Orientation: disoriented to time and place     Musculoskeletal  ROM R/L:  [facility] BLEs   Strength R/L:  BLEs at least 3/5     Mobility/treatment:   Rolling L/R:  NT   Supine to sit:  modA x 2 for partial guidance of LEs, hip scooting, and uprighting trunk. Constant cues for sequencing BLES to EOB. Transfers: squat pivot to the left from bed to recliner modA x 2 for fwd weight shift, lift, and sequencing turn. Assist with set up of BLEs and VCS for technique with maintaining NWB RLE. Sitting balance:  SBA for safety at EOB, static with single to no UE support   Standing balance:  NT   Gait: NT   Educated pt on POC, role of PT, DME use, WB restrictions.  Cues

## 2023-07-06 NOTE — TELEPHONE ENCOUNTER
Location of patient: [place]    Subjective: Caller states \"Can they put a cast on anywhere? My aunt fell. She fell last Thursday and they took her to [place]. They put a splint on. The next day they found there is a broken hip too. They didn't get her into ortho. The ortho doctor said he wouldn't mess with her hip for different reasons. We asked for a cast and he said he wouldn't in the hospital. He said she would have to come in on a stretcher to the office to get a cast.She is 89 and that is a lot for her to do. \"     Patient is currently inpatient at [facility] states she has talked to others who have gone to the ED and received a cast immediately after an injury. Educated on purpose of splinting post initial injury and risks of swelling and compartment syndrome that casts are not usually placed immediately for this risk. Informed sometimes casts are not placed that splints and monitoring are an orthopedists plan of care for fractures. Informed this RN is unsure if it is a physician preference to not cast in the hospital or if the hospital just does not have the equipment to cast. Informed if she is concerned to reach out to another orthopedist, and to discuss with the nursing staff taking care of the patient to see if they have seen casting inpatient. This RN does not know the resources of the hospital.     Recommended disposition: talk with hospital staff and other ortho if feels necessary to discuss casting inpatient. Care advice provided, patient verbalizes understanding; denies any other questions or concerns; instructed to call back for any new or worsening symptoms. Attention Provider: Thank you for allowing me to participate in the care of your patient. The patient was connected to triage in response to symptoms provided. Please do not respond through this encounter as the response is not directed to a shared pool.       Reason for Disposition   General information

## 2023-07-06 NOTE — PROGRESS NOTES
V2.0    [facility] Progress Note      Name:  Snow Regalado /Age/Sex: 1919  (80 y.o. female)   MRN & CSN:  [id number] & [id number] Encounter Date/Time: 2023 12:44 PM EDT   Location:  [address] [place] [address] PCP: Antonio Hernandez MD     [address] [place] [address] Day: 3    Assessment and Recommendations   Snow Regalado is a 80 y.o. female with pmh of hypertension, paroxysmal atrial fibrillation, on flecainide, not on anticoagulation, hearing deficit, currently under hospice care who was initially taken to [facility] ED  who presents with Nondisplaced fracture of base of neck of right femur, initial encounter for closed fracture [facility])      Plan:   Acute nondisplaced right femoral neck fracture:  -Patient status post fall at nursing home   -Patient usually ambulates with a walker, was unable to ambulate which prompted further imaging  -Orthopedic surgeon consulted from the ED, currently recommending splint and no intervention with nonweightbearing for right upper and lower extremity  -Bedrest  -Analgesics as needed    Closed intra-articular fracture of the distal end of the right radius-2023  -Patient has splint in place  -Visible fingers edematous, sensation intact  -CT head, CT C-spine-2023-no acute process. Probable calcified meningioma left frontal region measuring 13 mm    Compression fractures:  -As per care everywhere, patient had lumbar x-ray which revealed compression fracture of the body of the thoracic vertebra, L1 vertebrae, L2 vertebral age-indeterminate    Paroxysmal atrial fibrillation-continue Flecainide, not on anticoagulation  HTN-lisinopril, metoprolol, as needed hydralazine  Chronic normocytic anemia-monitor H&H   Hearing defect  Senile dementia-patient knows her name, date of birth did not know that she is in the hospital but was able to recall her niece and nephew's name. Patient on Depakote.   As per nursing home paperwork patient was recently prescribed 81.5

## 2023-07-06 NOTE — PROGRESS NOTES
Physician Progress Note      PATIENT:               Jeb Paz  CSN #:                  [id number]  :                       1919  ADMIT DATE:       2023 11:45 PM  [facility] DATE:  RESPONDING  PROVIDER #:        Maria E Casanova MD          QUERY TEXT:    Pt admitted with Acute nondisplaced right femoral neck fracture and old Closed   intra-articular fracture of distal end of right radius-2023. Pt noted to   have Osteopenia. If possible, please document in progress notes and discharge   summary if you are evaluating and/or treating any of the following: The medical record reflects the following:  Risk Factors:  Closed intra-articular fracture of distal end of right   radius-2023,  Acute nondisplaced right femoral neck fracture, ,   Fall at [facility]  Clinical Indicators: CT pelvis: Degenerative changes noted at the level of   L4-L5 and L5-S1. Superior endplate S1 Schmorl's node noted. Diffuse osteopenia,   XR hip: Diffuse osteopenia. Degenerative changes seen lower lumbar spine. Treatment: Ortho consult, Imaging, Splint to the right arm    Thank you, Patricia Treviño RN, CDS ([phone])  Options provided:  -- Pathological Acute nondisplaced right femoral neck fracture  -- Pathological Acute nondisplaced right femoral neck fracture due to   osteopenia  -- Pathological Acute nondisplaced right femoral neck fracture due to   osteopenia following fall which would not usually break a normal, healthy bone  -- Traumatic Acute nondisplaced right femoral neck fracture  -- Other - I will add my own diagnosis  -- Disagree - Not applicable / Not valid  -- Disagree - Clinically unable to determine / Unknown  -- Refer to Clinical Documentation Reviewer    PROVIDER RESPONSE TEXT:    This patient has a pathological Acute nondisplaced right femoral neck fracture   due to osteopenia following fall which would not usually break a normal,   healthy bone.     Query created by: Patricia Treviño on 2023 3:50

## 2023-07-06 NOTE — CARE COORDINATION
Therapy recommending SNF for patient. LVM for [facility] with [facility] admissions to see if patient needs precert to return skilled. I have asked [facility] to return my call at her earliest convenience. 1430 - Family and patient do NOT want [facility] for skilled. They would like referral made to [facility] as #1 choice. #2 choice is [facility].  Referral sent to [facility]

## 2023-07-07 LAB
ALBUMIN SERPL-MCNC: 2.8 GM/DL (ref 3.4–5)
ALP BLD-CCNC: 55 IU/L (ref 40–128)
ALT SERPL-CCNC: 8 U/L (ref 10–40)
ANION GAP SERPL CALCULATED.3IONS-SCNC: 10 MMOL/L (ref 4–16)
AST SERPL-CCNC: 14 IU/L (ref 15–37)
BASOPHILS ABSOLUTE: 0 K/CU MM
BASOPHILS RELATIVE PERCENT: 0.4 % (ref 0–1)
BILIRUB SERPL-MCNC: 0.6 MG/DL (ref 0–1)
BUN SERPL-MCNC: 33 MG/DL (ref 6–23)
CALCIUM SERPL-MCNC: 8.4 MG/DL (ref 8.3–10.6)
CHLORIDE BLD-SCNC: 106 MMOL/L (ref 99–110)
CO2: 24 MMOL/L (ref 21–32)
CREAT SERPL-MCNC: 0.9 MG/DL (ref 0.6–1.1)
DIFFERENTIAL TYPE: ABNORMAL
EOSINOPHILS ABSOLUTE: 0.2 K/CU MM
EOSINOPHILS RELATIVE PERCENT: 2.5 % (ref 0–3)
FERRITIN: 366 NG/ML (ref 15–150)
FOLATE SERPL-MCNC: >20 NG/ML (ref 3.1–17.5)
GFR SERPL CREATININE-BSD FRML MDRD: 56 ML/MIN/1.73M2
GLUCOSE SERPL-MCNC: 91 MG/DL (ref 70–99)
HCT VFR BLD CALC: 29.5 % (ref 37–47)
HCT VFR BLD CALC: 30.3 % (ref 37–47)
HEMOGLOBIN: 9.4 GM/DL (ref 12.5–16)
HEMOGLOBIN: 9.6 GM/DL (ref 12.5–16)
IMMATURE NEUTROPHIL %: 0.7 % (ref 0–0.43)
IRON: 34 UG/DL (ref 37–145)
LACTATE DEHYDROGENASE: 161 IU/L (ref 120–246)
LYMPHOCYTES ABSOLUTE: 1.9 K/CU MM
LYMPHOCYTES RELATIVE PERCENT: 26.6 % (ref 24–44)
MCH RBC QN AUTO: 28.1 PG (ref 27–31)
MCHC RBC AUTO-ENTMCNC: 31.9 % (ref 32–36)
MCV RBC AUTO: 88.3 FL (ref 78–100)
MONOCYTES ABSOLUTE: 0.9 K/CU MM
MONOCYTES RELATIVE PERCENT: 12.9 % (ref 0–4)
NUCLEATED RBC %: 0 %
PCT TRANSFERRIN: 21 % (ref 10–44)
PDW BLD-RTO: 14 % (ref 11.7–14.9)
PLATELET # BLD: 222 K/CU MM (ref 140–440)
PMV BLD AUTO: 9.1 FL (ref 7.5–11.1)
POTASSIUM SERPL-SCNC: 4 MMOL/L (ref 3.5–5.1)
RBC # BLD: 3.34 M/CU MM (ref 4.2–5.4)
RETICULOCYTE COUNT PCT: 1.7 % (ref 0.2–2.2)
SEGMENTED NEUTROPHILS ABSOLUTE COUNT: 4.1 K/CU MM
SEGMENTED NEUTROPHILS RELATIVE PERCENT: 56.9 % (ref 36–66)
SODIUM BLD-SCNC: 140 MMOL/L (ref 135–145)
TOTAL IMMATURE NEUTOROPHIL: 0.05 K/CU MM
TOTAL IRON BINDING CAPACITY: 165 UG/DL (ref 250–450)
TOTAL NUCLEATED RBC: 0 K/CU MM
TOTAL PROTEIN: 6 GM/DL (ref 6.4–8.2)
TRANSFERRIN SERPL-MCNC: 139.4 MG/DL (ref 200–360)
UNSATURATED IRON BINDING CAPACITY: 131 UG/DL (ref 110–370)
VITAMIN B-12: 693.6 PG/ML (ref 211–911)
WBC # BLD: 7.1 K/CU MM (ref 4–10.5)

## 2023-07-07 PROCEDURE — 2580000003 HC RX 258: Performed by: INTERNAL MEDICINE

## 2023-07-07 PROCEDURE — 85014 HEMATOCRIT: CPT

## 2023-07-07 PROCEDURE — 36415 COLL VENOUS BLD VENIPUNCTURE: CPT

## 2023-07-07 PROCEDURE — 97535 SELF CARE MNGMENT TRAINING: CPT

## 2023-07-07 PROCEDURE — 85025 COMPLETE CBC W/AUTO DIFF WBC: CPT

## 2023-07-07 PROCEDURE — 83540 ASSAY OF IRON: CPT

## 2023-07-07 PROCEDURE — 82607 VITAMIN B-12: CPT

## 2023-07-07 PROCEDURE — 94761 N-INVAS EAR/PLS OXIMETRY MLT: CPT

## 2023-07-07 PROCEDURE — 6370000000 HC RX 637 (ALT 250 FOR IP): Performed by: INTERNAL MEDICINE

## 2023-07-07 PROCEDURE — 84466 ASSAY OF TRANSFERRIN: CPT

## 2023-07-07 PROCEDURE — 1200000000 HC SEMI PRIVATE

## 2023-07-07 PROCEDURE — 6360000002 HC RX W HCPCS: Performed by: INTERNAL MEDICINE

## 2023-07-07 PROCEDURE — 97530 THERAPEUTIC ACTIVITIES: CPT

## 2023-07-07 PROCEDURE — 83615 LACTATE (LD) (LDH) ENZYME: CPT

## 2023-07-07 PROCEDURE — 82728 ASSAY OF FERRITIN: CPT

## 2023-07-07 PROCEDURE — 82746 ASSAY OF FOLIC ACID SERUM: CPT

## 2023-07-07 PROCEDURE — 83010 ASSAY OF HAPTOGLOBIN QUANT: CPT

## 2023-07-07 PROCEDURE — 85018 HEMOGLOBIN: CPT

## 2023-07-07 PROCEDURE — 80053 COMPREHEN METABOLIC PANEL: CPT

## 2023-07-07 PROCEDURE — 85045 AUTOMATED RETICULOCYTE COUNT: CPT

## 2023-07-07 RX ADMIN — SODIUM CHLORIDE, PRESERVATIVE FREE 10 ML: 5 INJECTION INTRAVENOUS at 00:15

## 2023-07-07 RX ADMIN — LISINOPRIL 10 MG: 5 TABLET ORAL at 08:56

## 2023-07-07 RX ADMIN — METOPROLOL TARTRATE 12.5 MG: 25 TABLET, FILM COATED ORAL at 21:21

## 2023-07-07 RX ADMIN — SODIUM CHLORIDE, PRESERVATIVE FREE 10 ML: 5 INJECTION INTRAVENOUS at 21:20

## 2023-07-07 RX ADMIN — MELATONIN TAB 3 MG 3 MG: 3 TAB at 00:01

## 2023-07-07 RX ADMIN — DIVALPROEX SODIUM 250 MG: 250 TABLET, DELAYED RELEASE ORAL at 00:00

## 2023-07-07 RX ADMIN — FLECAINIDE ACETATE 50 MG: 50 TABLET ORAL at 08:56

## 2023-07-07 RX ADMIN — MELATONIN TAB 3 MG 3 MG: 3 TAB at 21:22

## 2023-07-07 RX ADMIN — THIAMINE HYDROCHLORIDE 100 MG: 100 INJECTION, SOLUTION INTRAMUSCULAR; INTRAVENOUS at 08:56

## 2023-07-07 RX ADMIN — METOPROLOL TARTRATE 12.5 MG: 25 TABLET, FILM COATED ORAL at 00:01

## 2023-07-07 RX ADMIN — ACETAMINOPHEN 650 MG: 325 TABLET ORAL at 00:00

## 2023-07-07 RX ADMIN — ENOXAPARIN SODIUM 30 MG: 100 INJECTION SUBCUTANEOUS at 08:55

## 2023-07-07 RX ADMIN — DIVALPROEX SODIUM 250 MG: 250 TABLET, DELAYED RELEASE ORAL at 21:22

## 2023-07-07 RX ADMIN — METOPROLOL TARTRATE 12.5 MG: 25 TABLET, FILM COATED ORAL at 08:56

## 2023-07-07 RX ADMIN — FLECAINIDE ACETATE 50 MG: 50 TABLET ORAL at 00:01

## 2023-07-07 RX ADMIN — SODIUM CHLORIDE, PRESERVATIVE FREE 10 ML: 5 INJECTION INTRAVENOUS at 08:59

## 2023-07-07 RX ADMIN — FLECAINIDE ACETATE 50 MG: 50 TABLET ORAL at 21:23

## 2023-07-07 ASSESSMENT — PAIN DESCRIPTION - ORIENTATION: ORIENTATION: RIGHT

## 2023-07-07 ASSESSMENT — PAIN DESCRIPTION - DESCRIPTORS: DESCRIPTORS: ACHING

## 2023-07-07 ASSESSMENT — PAIN DESCRIPTION - LOCATION: LOCATION: HIP;ARM

## 2023-07-07 ASSESSMENT — PAIN SCALES - GENERAL: PAINLEVEL_OUTOF10: 5

## 2023-07-07 NOTE — CARE COORDINATION
Roshan contacted Gamaliel Jung in admissions at [facility] to check status of referral. Gamaliel Jung states that they have not received a referral on this pt. CM provided Gamaliel Jung with referral/demographics to review chart in epic. Gamaliel Jung will review case and return call to CM as soon as possible to let Cm know if they are able to accept pt.

## 2023-07-07 NOTE — PROGRESS NOTES
completed stand pivot transfer bed to chair mod A x 2 on Lt LE with max coaching for technique. Seated at chair level, pt completed facial hygiene and oral hygiene tasks of brushing teeth/rinsing SBA with setup using Lt hand. Setup required for opening toothbrush package, toothpaste container, and applying paste to brush. Pt left positioned for comfort in chair with all lines intact, all needs within reach, and chair alarm on. Assessment / Impression:    Patient's tolerance of treatment: Fair  Adverse Reaction: None  Significant change in status and impact: Similar presentation as during initial evaluation  Barriers to improvement: None noted    Plan for Next Session:    Continue per OT POC. Continue to recommend SNF for rehab at discharge.     Time in: 1049  Time out: 1112  Timed treatment minutes: 23  Total treatment time: 23    Electronically signed by:    Heidy Ortiz OTR/L, [address] [place] [phone]

## 2023-07-07 NOTE — PROGRESS NOTES
Physical Therapy    Physical Therapy Treatment Note  Name: Analy Cosby MRN: [id number] :   1919   Date:  2023   Admission Date: 2023 Room:  [address] [place] [address]   Restrictions/Precautions:           NWB RUE and NWB RLE, gentle ROM to RLE  Communication with other providers:  OT   Subjective:  Patient states:  \"I don't care\"   Pain:   Location, Type, Intensity (0/10 to 10/10):  denies   Objective:    Observation:    Supine in bed. Cooperative with therapy. Objective Measures:    Stable vitals on room air. Reported slight dizziness with change in positions. Treatment, including education/measures:  Supine to sit: modA x 2 for partial guidance of BLEs, hip scooting, and uprighting trunk. Multiple cues for non use of RUE due to WB restrictions. Scooting: fwd to EOB modA with cues for non use of RUE and lateral weight shift     Sitting balance: x ~5 minutes at EOB, static with single UE support     Sit to stand: partial stand from EOB maxA for fwd weight shift and lift assist with cues for NWB RUE/RLE. Stand to sit: maxA for controlling sit to EOB   Squat pivot: modA x 2 for fwd weight shift, lift, and sequencing turn. VCS and set up assist for maintaining NWB RLE/RUE. Standing balance: briefly (<10 seconds) with partial stand maxA    Educated pt on POC, role of PT, WB restrictions. Cues provided for sequencing to inc safety and indep with mobility     Assessment / Impression:    Discharge recommendations: SNF   Patient's tolerance of treatment:  good   Adverse Reaction: no  Significant change in status and impact: no    Barriers to improvement:  activity tolerance, strength, balance, WB restrictions.      Plan for Next Session:    Activity tolerance, strength, balance, transfers, bed mobility   Time in:  1049  Time out:  1105  Timed treatment minutes:  16   Total treatment time:  16 minutes     Previously filed items:  Social/Functional History  Type of Home: Facility (LTC)  Additional Comments:

## 2023-07-07 NOTE — PROGRESS NOTES
12/24/2018 06:45 PM    COLORU YELLOW 12/24/2018 06:45 PM    WBCUA 15 TO 25 12/24/2018 06:45 PM    RBCUA 0 TO 3 12/24/2018 06:45 PM    MUCUS NEGATIVE 12/24/2018 06:45 PM    BACTERIA MODERATE 12/24/2018 06:45 PM    CLARITYU TURBID 12/24/2018 06:45 PM    SPECGRAV 1.025 12/24/2018 06:45 PM    LEUKOCYTESUR LARGE 12/24/2018 06:45 PM    UROBILINOGEN 0.2 12/24/2018 06:45 PM    BILIRUBINUR NEGATIVE 12/24/2018 06:45 PM    BLOODU LARGE 12/24/2018 06:45 PM    KETUA NEGATIVE 12/24/2018 06:45 PM     Urine Cultures: No results found for: Windy Ebbs  Blood Cultures: No results found for: BC  No results found for: BLOODCULT2  Organism:   Lab Results   Component Value Date/Time    ORG ECOL 12/24/2018 06:45 PM         Electronically signed by Lamar Morgan MD on 7/7/2023 at 12:45 PM

## 2023-07-08 LAB
ALBUMIN SERPL-MCNC: 2.9 GM/DL (ref 3.4–5)
ALP BLD-CCNC: 57 IU/L (ref 40–128)
ALT SERPL-CCNC: 7 U/L (ref 10–40)
ANION GAP SERPL CALCULATED.3IONS-SCNC: 12 MMOL/L (ref 4–16)
AST SERPL-CCNC: 15 IU/L (ref 15–37)
BASOPHILS ABSOLUTE: 0 K/CU MM
BASOPHILS RELATIVE PERCENT: 0.6 % (ref 0–1)
BILIRUB SERPL-MCNC: 0.6 MG/DL (ref 0–1)
BUN SERPL-MCNC: 28 MG/DL (ref 6–23)
CALCIUM SERPL-MCNC: 8.3 MG/DL (ref 8.3–10.6)
CHLORIDE BLD-SCNC: 104 MMOL/L (ref 99–110)
CO2: 23 MMOL/L (ref 21–32)
CREAT SERPL-MCNC: 0.7 MG/DL (ref 0.6–1.1)
DIFFERENTIAL TYPE: ABNORMAL
EOSINOPHILS ABSOLUTE: 0.2 K/CU MM
EOSINOPHILS RELATIVE PERCENT: 3.3 % (ref 0–3)
GFR SERPL CREATININE-BSD FRML MDRD: >60 ML/MIN/1.73M2
GLUCOSE SERPL-MCNC: 76 MG/DL (ref 70–99)
HAPTOGLOB SERPL-MCNC: 321 MG/DL (ref 30–200)
HCT VFR BLD CALC: 30.3 % (ref 37–47)
HEMOGLOBIN: 9.6 GM/DL (ref 12.5–16)
IMMATURE NEUTROPHIL %: 0.8 % (ref 0–0.43)
LYMPHOCYTES ABSOLUTE: 2 K/CU MM
LYMPHOCYTES RELATIVE PERCENT: 30.9 % (ref 24–44)
MCH RBC QN AUTO: 27.9 PG (ref 27–31)
MCHC RBC AUTO-ENTMCNC: 31.7 % (ref 32–36)
MCV RBC AUTO: 88.1 FL (ref 78–100)
MONOCYTES ABSOLUTE: 0.7 K/CU MM
MONOCYTES RELATIVE PERCENT: 10.6 % (ref 0–4)
NUCLEATED RBC %: 0 %
PDW BLD-RTO: 13.9 % (ref 11.7–14.9)
PLATELET # BLD: 228 K/CU MM (ref 140–440)
PMV BLD AUTO: 9 FL (ref 7.5–11.1)
POTASSIUM SERPL-SCNC: 4.1 MMOL/L (ref 3.5–5.1)
RBC # BLD: 3.44 M/CU MM (ref 4.2–5.4)
SEGMENTED NEUTROPHILS ABSOLUTE COUNT: 3.5 K/CU MM
SEGMENTED NEUTROPHILS RELATIVE PERCENT: 53.8 % (ref 36–66)
SODIUM BLD-SCNC: 139 MMOL/L (ref 135–145)
TOTAL IMMATURE NEUTOROPHIL: 0.05 K/CU MM
TOTAL NUCLEATED RBC: 0 K/CU MM
TOTAL PROTEIN: 6 GM/DL (ref 6.4–8.2)
WBC # BLD: 6.4 K/CU MM (ref 4–10.5)

## 2023-07-08 PROCEDURE — 85014 HEMATOCRIT: CPT

## 2023-07-08 PROCEDURE — 6360000002 HC RX W HCPCS: Performed by: INTERNAL MEDICINE

## 2023-07-08 PROCEDURE — 2580000003 HC RX 258: Performed by: INTERNAL MEDICINE

## 2023-07-08 PROCEDURE — 6370000000 HC RX 637 (ALT 250 FOR IP): Performed by: INTERNAL MEDICINE

## 2023-07-08 PROCEDURE — 85025 COMPLETE CBC W/AUTO DIFF WBC: CPT

## 2023-07-08 PROCEDURE — 36415 COLL VENOUS BLD VENIPUNCTURE: CPT

## 2023-07-08 PROCEDURE — 94761 N-INVAS EAR/PLS OXIMETRY MLT: CPT

## 2023-07-08 PROCEDURE — 80053 COMPREHEN METABOLIC PANEL: CPT

## 2023-07-08 PROCEDURE — 2060000000 HC ICU INTERMEDIATE R&B

## 2023-07-08 PROCEDURE — 85018 HEMOGLOBIN: CPT

## 2023-07-08 PROCEDURE — 2000000000 HC ICU R&B

## 2023-07-08 RX ADMIN — SODIUM CHLORIDE, PRESERVATIVE FREE 10 ML: 5 INJECTION INTRAVENOUS at 21:21

## 2023-07-08 RX ADMIN — HYDRALAZINE HYDROCHLORIDE 5 MG: 20 INJECTION INTRAMUSCULAR; INTRAVENOUS at 16:37

## 2023-07-08 RX ADMIN — DIVALPROEX SODIUM 250 MG: 250 TABLET, DELAYED RELEASE ORAL at 22:03

## 2023-07-08 RX ADMIN — SODIUM CHLORIDE, PRESERVATIVE FREE 10 ML: 5 INJECTION INTRAVENOUS at 09:10

## 2023-07-08 RX ADMIN — LISINOPRIL 10 MG: 5 TABLET ORAL at 09:09

## 2023-07-08 RX ADMIN — METOPROLOL TARTRATE 12.5 MG: 25 TABLET, FILM COATED ORAL at 09:09

## 2023-07-08 RX ADMIN — METOPROLOL TARTRATE 12.5 MG: 25 TABLET, FILM COATED ORAL at 21:22

## 2023-07-08 RX ADMIN — MELATONIN TAB 3 MG 3 MG: 3 TAB at 21:22

## 2023-07-08 RX ADMIN — THIAMINE HYDROCHLORIDE 100 MG: 100 INJECTION, SOLUTION INTRAMUSCULAR; INTRAVENOUS at 09:09

## 2023-07-08 RX ADMIN — FLECAINIDE ACETATE 50 MG: 50 TABLET ORAL at 22:03

## 2023-07-08 RX ADMIN — FLECAINIDE ACETATE 50 MG: 50 TABLET ORAL at 09:09

## 2023-07-08 RX ADMIN — ENOXAPARIN SODIUM 30 MG: 100 INJECTION SUBCUTANEOUS at 09:10

## 2023-07-08 RX ADMIN — ACETAMINOPHEN 650 MG: 325 TABLET ORAL at 16:37

## 2023-07-08 ASSESSMENT — PAIN DESCRIPTION - LOCATION: LOCATION: HIP

## 2023-07-08 ASSESSMENT — PAIN DESCRIPTION - DESCRIPTORS: DESCRIPTORS: ACHING;DISCOMFORT

## 2023-07-08 ASSESSMENT — PAIN SCALES - GENERAL
PAINLEVEL_OUTOF10: 0
PAINLEVEL_OUTOF10: 5

## 2023-07-08 ASSESSMENT — PAIN SCALES - WONG BAKER: WONGBAKER_NUMERICALRESPONSE: 0

## 2023-07-08 ASSESSMENT — PAIN - FUNCTIONAL ASSESSMENT: PAIN_FUNCTIONAL_ASSESSMENT: PREVENTS OR INTERFERES SOME ACTIVE ACTIVITIES AND ADLS

## 2023-07-08 NOTE — PLAN OF CARE
Problem: Discharge Planning  Goal: Discharge to home or other facility with appropriate resources  7/8/2023 0028 by Alee Blood LPN  Outcome: Progressing  7/7/2023 1527 by Gurinder San RN  Outcome: Progressing     Problem: Safety - Adult  Goal: Free from fall injury  7/8/2023 0028 by Alee Blood LPN  Outcome: Progressing  7/7/2023 1527 by Gurinder San RN  Outcome: Progressing     Problem: Skin/Tissue Integrity  Goal: Absence of new skin breakdown  Description: 1. Monitor for areas of redness and/or skin breakdown  2. Assess vascular access sites hourly  3. Every 4-6 hours minimum:  Change oxygen saturation probe site  4. Every 4-6 hours:  If on nasal continuous positive airway pressure, respiratory therapy assess nares and determine need for appliance change or resting period.   7/8/2023 0028 by Alee Blood LPN  Outcome: Progressing  7/7/2023 1527 by Gurinder San RN  Outcome: Progressing     Problem: ABCDS Injury Assessment  Goal: Absence of physical injury  7/8/2023 0028 by Alee Blood LPN  Outcome: Progressing  7/7/2023 1527 by Gurinder San RN  Outcome: Progressing     Problem: Pain  Goal: Verbalizes/displays adequate comfort level or baseline comfort level  7/8/2023 0028 by Alee Blood LPN  Outcome: Progressing  7/7/2023 1527 by Gurinder San RN  Outcome: Progressing

## 2023-07-08 NOTE — PROGRESS NOTES
Went into the room because primary nurse stated she was making some comments that made her question whether or not she was suicidal.  Patient is confused at times. When I was in the room I was asking the patient if she knew where she was and she said \"I don't give a damn. \"  \"Just give me a bottle of medication so I won't be a bother anymore. \"  \"Let me walk out into the street and get hit by a bus. \" \"Put me in a bag and throw me in a river. \" \"She stated I don't have any family what does it matter\"  \"No one has time for me. \" She stopped answering questions. Sitter at bedside. Will make house supervisor aware along with primary doctor. Will keep monitoring.

## 2023-07-08 NOTE — PROGRESS NOTES
V2.0    [facility] Progress Note      Name:  Yang Wray /Age/Sex: 1919  (80 y.o. female)   MRN & CSN:  [id number] & [id number] Encounter Date/Time: 2023 12:44 PM EDT   Location:  -A PCP: Meghan Domingo MD     [address] [place], [address] [place] [address] Day: 5    Assessment and Recommendations   Yang Wray is a 80 y.o. female with pmh of hypertension, paroxysmal atrial fibrillation, on flecainide, not on anticoagulation, hearing deficit, currently under hospice care who was initially taken to [facility] ED  who presents with Nondisplaced fracture of base of neck of right femur, initial encounter for closed fracture [facility])      Plan:   Acute nondisplaced right femoral neck fracture:  -Patient status post fall at nursing home   -Patient usually ambulates with a walker, was unable to ambulate which prompted further imaging  -Orthopedic surgeon consulted from the ED, currently recommending splint and no intervention with nonweightbearing for right upper and lower extremity  -Bedrest  -Analgesics as needed    Closed intra-articular fracture of the distal end of the right radius-2023  -Patient has splint in place  -Visible fingers edematous, sensation intact  -CT head, CT C-spine-2023-no acute process. Probable calcified meningioma left frontal region measuring 13 mm    Compression fractures:  -As per care everywhere, patient had lumbar x-ray which revealed compression fracture of the body of the thoracic vertebra, L1 vertebrae, L2 vertebral age-indeterminate    Paroxysmal atrial fibrillation-continue Flecainide, not on anticoagulation  HTN-lisinopril, metoprolol, as needed hydralazine  Chronic normocytic anemia-monitor H&H   Hearing defect  Senile dementia-patient knows her name, date of birth did not know that she is in the hospital but was able to recall her niece and nephew's name. Patient on Depakote.   As per nursing home paperwork patient was recently prescribed 81.5

## 2023-07-09 LAB
HCT VFR BLD CALC: 29.8 % (ref 37–47)
HEMOGLOBIN: 9.8 GM/DL (ref 12.5–16)

## 2023-07-09 PROCEDURE — 2580000003 HC RX 258: Performed by: INTERNAL MEDICINE

## 2023-07-09 PROCEDURE — 6360000002 HC RX W HCPCS: Performed by: INTERNAL MEDICINE

## 2023-07-09 PROCEDURE — 85014 HEMATOCRIT: CPT

## 2023-07-09 PROCEDURE — 6370000000 HC RX 637 (ALT 250 FOR IP): Performed by: INTERNAL MEDICINE

## 2023-07-09 PROCEDURE — 85018 HEMOGLOBIN: CPT

## 2023-07-09 PROCEDURE — 94761 N-INVAS EAR/PLS OXIMETRY MLT: CPT

## 2023-07-09 PROCEDURE — 1200000000 HC SEMI PRIVATE

## 2023-07-09 PROCEDURE — 99233 SBSQ HOSP IP/OBS HIGH 50: CPT | Performed by: PSYCHIATRY & NEUROLOGY

## 2023-07-09 RX ADMIN — MELATONIN TAB 3 MG 3 MG: 3 TAB at 22:40

## 2023-07-09 RX ADMIN — METOPROLOL TARTRATE 12.5 MG: 25 TABLET, FILM COATED ORAL at 09:40

## 2023-07-09 RX ADMIN — FLECAINIDE ACETATE 50 MG: 50 TABLET ORAL at 09:41

## 2023-07-09 RX ADMIN — SODIUM CHLORIDE, PRESERVATIVE FREE 10 ML: 5 INJECTION INTRAVENOUS at 09:40

## 2023-07-09 RX ADMIN — FLECAINIDE ACETATE 50 MG: 50 TABLET ORAL at 22:51

## 2023-07-09 RX ADMIN — ENOXAPARIN SODIUM 30 MG: 100 INJECTION SUBCUTANEOUS at 09:40

## 2023-07-09 RX ADMIN — DIVALPROEX SODIUM 250 MG: 250 TABLET, DELAYED RELEASE ORAL at 22:51

## 2023-07-09 RX ADMIN — THIAMINE HYDROCHLORIDE 100 MG: 100 INJECTION, SOLUTION INTRAMUSCULAR; INTRAVENOUS at 09:39

## 2023-07-09 RX ADMIN — METOPROLOL TARTRATE 12.5 MG: 25 TABLET, FILM COATED ORAL at 22:40

## 2023-07-09 RX ADMIN — SODIUM CHLORIDE, PRESERVATIVE FREE 10 ML: 5 INJECTION INTRAVENOUS at 22:52

## 2023-07-09 RX ADMIN — LISINOPRIL 10 MG: 5 TABLET ORAL at 09:39

## 2023-07-09 RX ADMIN — ACETAMINOPHEN 650 MG: 325 TABLET ORAL at 09:52

## 2023-07-09 ASSESSMENT — PAIN DESCRIPTION - ORIENTATION: ORIENTATION: RIGHT

## 2023-07-09 ASSESSMENT — PAIN SCALES - GENERAL
PAINLEVEL_OUTOF10: 3
PAINLEVEL_OUTOF10: 0
PAINLEVEL_OUTOF10: 0

## 2023-07-09 ASSESSMENT — PAIN DESCRIPTION - DESCRIPTORS: DESCRIPTORS: ACHING

## 2023-07-09 ASSESSMENT — PAIN SCALES - WONG BAKER: WONGBAKER_NUMERICALRESPONSE: 0

## 2023-07-09 ASSESSMENT — PAIN DESCRIPTION - LOCATION: LOCATION: ARM

## 2023-07-09 NOTE — CONSULTS
Psychiatric Consult  Tenisha Kuhn  [phone]  7/4/2023 07/09/23      ID: Patient is a 80 y.o. female    Communication: The patient is currently unable to express a treatment choice  the patient was able to explain the rationale behind the diagnosis and treatment plan. Frequent changes back and forth in the decision-making and comprhension. Understanding: The patient was unable to recall conversations about treatment, to make the link between causal relationships, and to process probabilities for outcomes. PT continues to display problems with memory, attention span, and neurocognition. Appreciation: The patient was unable to identify the illness, treatment options, and likely outcomes as things that will affect her directly. Rationalization or reasoning: The patient was  unable to weigh the risks and benefits of the treatment options presented to come to a conclusion in keeping with their goals and best interests, as defined by his own personal set of values. CC: why am I in the hospital    HPI:  Pt is a 79 yo  female who presents for exacerbation of neurocognitive D/O with recent hip fracture due unable to have surgical repair. Pt noted recent exacarbation of mood but feels safe on her current medications. Pt noted she currently feels safe and comfortable on the unit. Pt was in agreement with treatment team.  Pt was polite and cordial during the interview process. Pt noted she is doing [place] today. \"  Pt noted she is sleeping \"okay. ..last night. \"  Pt noted her apptetite is okay. Pt rated her depresssion a \"none,\" on a scale of zero to ten with ten being the worst and zero being none. Pt rated her anxiety a \"none,\" on the same scale. Pt denied any thoughts to harm herself or anyone else. Pt denied any auditory or visiual hallucintations.       Pt denied any hx of seizures, TBIs, Hep C or HIV  No TD noted, AIMS=0,     Pt denied any hx of previous inpt psychiatric admissions  Pt

## 2023-07-09 NOTE — PROGRESS NOTES
4 Eyes Skin Assessment     NAME:  Snow Regalado  YOB: 1919  MEDICAL RECORD NUMBER:  [id number]    The patient is being assessed for  {Reason for Assessment:49215}    I agree that at least one RN has performed a thorough Head to Toe Skin Assessment on the patient. ALL assessment sites listed below have been assessed. Areas assessed by both nurses:    Head, Face, Ears, Shoulders, Back, Chest, Arms, Elbows, Hands, Sacrum. Buttock, Coccyx, Ischium, and Legs. Feet and Heels        Does the Patient have a Wound?  No noted wound(s)       Rey Prevention initiated by RN: No  Wound Care Orders initiated by RN: No    Pressure Injury (Stage 3,4, Unstageable, DTI, NWPT, and Complex wounds) if present, place Wound referral order by RN under : No    New Ostomies, if present place, Ostomy referral order under : No     Nurse 1 eSignature: Electronically signed by Rodney Wolff LPN on 5/6/03 at 0:96 PM EDT    **SHARE this note so that the co-signing nurse can place an eSignature**    Nurse 2 eSignature: {Esignature:[id number]}

## 2023-07-10 LAB
ALBUMIN SERPL-MCNC: 3.3 GM/DL (ref 3.4–5)
ALP BLD-CCNC: 68 IU/L (ref 40–129)
ALT SERPL-CCNC: 11 U/L (ref 10–40)
ANION GAP SERPL CALCULATED.3IONS-SCNC: 13 MMOL/L (ref 4–16)
AST SERPL-CCNC: 23 IU/L (ref 15–37)
BACTERIA: ABNORMAL /HPF
BASOPHILS ABSOLUTE: 0 K/CU MM
BASOPHILS RELATIVE PERCENT: 0.5 % (ref 0–1)
BILIRUB SERPL-MCNC: 0.6 MG/DL (ref 0–1)
BILIRUBIN URINE: NEGATIVE MG/DL
BLOOD, URINE: ABNORMAL
BUN SERPL-MCNC: 22 MG/DL (ref 6–23)
CALCIUM SERPL-MCNC: 8.4 MG/DL (ref 8.3–10.6)
CHLORIDE BLD-SCNC: 103 MMOL/L (ref 99–110)
CLARITY: ABNORMAL
CO2: 23 MMOL/L (ref 21–32)
COLOR: YELLOW
CREAT SERPL-MCNC: 0.8 MG/DL (ref 0.6–1.1)
DIFFERENTIAL TYPE: ABNORMAL
EOSINOPHILS ABSOLUTE: 0.2 K/CU MM
EOSINOPHILS RELATIVE PERCENT: 2.4 % (ref 0–3)
GFR SERPL CREATININE-BSD FRML MDRD: >60 ML/MIN/1.73M2
GLUCOSE SERPL-MCNC: 71 MG/DL (ref 70–99)
GLUCOSE, URINE: NEGATIVE MG/DL
HCT VFR BLD CALC: 33.5 % (ref 37–47)
HCT VFR BLD CALC: 33.7 % (ref 37–47)
HEMOGLOBIN: 10.5 GM/DL (ref 12.5–16)
HEMOGLOBIN: 10.6 GM/DL (ref 12.5–16)
IMMATURE NEUTROPHIL %: 1.1 % (ref 0–0.43)
KETONES, URINE: >80 MG/DL
LEUKOCYTE ESTERASE, URINE: ABNORMAL
LYMPHOCYTES ABSOLUTE: 2.4 K/CU MM
LYMPHOCYTES RELATIVE PERCENT: 31.4 % (ref 24–44)
MCH RBC QN AUTO: 28 PG (ref 27–31)
MCHC RBC AUTO-ENTMCNC: 31.3 % (ref 32–36)
MCV RBC AUTO: 89.3 FL (ref 78–100)
MONOCYTES ABSOLUTE: 0.6 K/CU MM
MONOCYTES RELATIVE PERCENT: 8.2 % (ref 0–4)
MUCUS: ABNORMAL HPF
NITRITE URINE, QUANTITATIVE: POSITIVE
NUCLEATED RBC %: 0 %
PDW BLD-RTO: 13.8 % (ref 11.7–14.9)
PH, URINE: 7 (ref 5–8)
PLATELET # BLD: 286 K/CU MM (ref 140–440)
PMV BLD AUTO: 8.7 FL (ref 7.5–11.1)
POTASSIUM SERPL-SCNC: 4.4 MMOL/L (ref 3.5–5.1)
PROTEIN UA: 30 MG/DL
RBC # BLD: 3.75 M/CU MM (ref 4.2–5.4)
RBC URINE: 122 /HPF (ref 0–6)
SEGMENTED NEUTROPHILS ABSOLUTE COUNT: 4.2 K/CU MM
SEGMENTED NEUTROPHILS RELATIVE PERCENT: 56.4 % (ref 36–66)
SODIUM BLD-SCNC: 139 MMOL/L (ref 135–145)
SPECIFIC GRAVITY UA: 1.02 (ref 1–1.03)
SQUAMOUS EPITHELIAL: 4 /HPF
TOTAL IMMATURE NEUTOROPHIL: 0.08 K/CU MM
TOTAL NUCLEATED RBC: 0 K/CU MM
TOTAL PROTEIN: 6.7 GM/DL (ref 6.4–8.2)
TRICHOMONAS: ABNORMAL /HPF
UROBILINOGEN, URINE: 1 MG/DL (ref 0.2–1)
WBC # BLD: 7.5 K/CU MM (ref 4–10.5)
WBC CLUMP: ABNORMAL /HPF
WBC UA: 399 /HPF (ref 0–5)

## 2023-07-10 PROCEDURE — 97530 THERAPEUTIC ACTIVITIES: CPT

## 2023-07-10 PROCEDURE — 2580000003 HC RX 258: Performed by: FAMILY MEDICINE

## 2023-07-10 PROCEDURE — 87077 CULTURE AEROBIC IDENTIFY: CPT

## 2023-07-10 PROCEDURE — 81001 URINALYSIS AUTO W/SCOPE: CPT

## 2023-07-10 PROCEDURE — 85025 COMPLETE CBC W/AUTO DIFF WBC: CPT

## 2023-07-10 PROCEDURE — 87086 URINE CULTURE/COLONY COUNT: CPT

## 2023-07-10 PROCEDURE — 80053 COMPREHEN METABOLIC PANEL: CPT

## 2023-07-10 PROCEDURE — 36415 COLL VENOUS BLD VENIPUNCTURE: CPT

## 2023-07-10 PROCEDURE — 1200000000 HC SEMI PRIVATE

## 2023-07-10 PROCEDURE — 97112 NEUROMUSCULAR REEDUCATION: CPT

## 2023-07-10 PROCEDURE — 97535 SELF CARE MNGMENT TRAINING: CPT

## 2023-07-10 PROCEDURE — 6360000002 HC RX W HCPCS: Performed by: FAMILY MEDICINE

## 2023-07-10 PROCEDURE — 85014 HEMATOCRIT: CPT

## 2023-07-10 PROCEDURE — 2580000003 HC RX 258: Performed by: INTERNAL MEDICINE

## 2023-07-10 PROCEDURE — 6370000000 HC RX 637 (ALT 250 FOR IP): Performed by: INTERNAL MEDICINE

## 2023-07-10 PROCEDURE — 94761 N-INVAS EAR/PLS OXIMETRY MLT: CPT

## 2023-07-10 PROCEDURE — 85018 HEMOGLOBIN: CPT

## 2023-07-10 PROCEDURE — 87186 SC STD MICRODIL/AGAR DIL: CPT

## 2023-07-10 RX ADMIN — MELATONIN TAB 3 MG 3 MG: 3 TAB at 22:18

## 2023-07-10 RX ADMIN — METOPROLOL TARTRATE 12.5 MG: 25 TABLET, FILM COATED ORAL at 22:16

## 2023-07-10 RX ADMIN — CEFTRIAXONE SODIUM 1000 MG: 1 INJECTION, POWDER, FOR SOLUTION INTRAMUSCULAR; INTRAVENOUS at 22:15

## 2023-07-10 RX ADMIN — FLECAINIDE ACETATE 50 MG: 50 TABLET ORAL at 22:16

## 2023-07-10 RX ADMIN — DIVALPROEX SODIUM 250 MG: 250 TABLET, DELAYED RELEASE ORAL at 22:16

## 2023-07-10 RX ADMIN — SODIUM CHLORIDE, PRESERVATIVE FREE 10 ML: 5 INJECTION INTRAVENOUS at 22:18

## 2023-07-10 RX ADMIN — SODIUM CHLORIDE: 9 INJECTION, SOLUTION INTRAVENOUS at 22:14

## 2023-07-10 ASSESSMENT — PAIN SCALES - GENERAL: PAINLEVEL_OUTOF10: 0

## 2023-07-10 NOTE — PROGRESS NOTES
Occupational Therapy    Occupational Therapy Treatment Note    Name: Janey Gonsalez MRN: [id number] :   1919   Date:  7/10/2023   Admission Date: 2023 Room:  [address] [place] [address]     Primary Problem: Rt subcapital femoral neck fracture, Rt distal radius fracture    Restrictions/Precautions: General Precautions, Fall Risk, NWB Rt UE and Rt LE, Telemetry, Pulse Ox, BP cuff, Bed/chair alarm    Communication with other providers: ISRA Will    Subjective:  Patient states: \"I want some vanilla ice cream!\"   Pain: Pt denied pain this date    Objective:    Observation: Pt received in supine upon OT arrival. Pleasantly confused but agreeable to treatment. Objective Measures: Disoriented to time/place/full situation    Treatment, including education:  Therapeutic Activity Training:   Therapeutic activity training was instructed today. Cues were given for safety, sequence, UE/LE placement, awareness, and balance. Self Care Training:   Cues were given for safety, sequence, UE/LE placement, visual cues, and balance. Pt received in supine upon OT arrival. Pt re-educated on role of OT, POC, importance of EOB/OOB activity, and NWB status Rt UE and LE. Pt transferred supine to sitting EOB mod A x 2 with HOB elevated to 40'. Pt required assist for scooting Rt LE to edge and mod trunk boost for uprighting. Pt able to sit at EOB level SBA with good- static sitting balance. Pt dependent with donning BL socks seated EOB. Pt ate cup of vanilla ice cream seated EOB min A with setup using Lt hand. Initial assist required for placement of ice cream onto spoon due to being excessively frozen but pt able to grasp utensil and bring to mouth without assist. Pt completed squat pivot transfer bed to chair mod A x 2 on Lt LE with max coaching for technique and maintaining NWB status Rt UE and LE. Pt completed grooming task of brushing hair using Lt hand min A with setup.  Assist required for thoroughly combing back of

## 2023-07-10 NOTE — CARE COORDINATION
CM called Vivian Willson at [facility]. CM left a VM regarding determination. [address]  1015 CM received call from Vivian Willson at [facility]. Pt is approved. 1100 CM informed per IDR that pt is ready for discharge. 1200 CM called Vivian Willson for [employer] to be started. [address]  1530 CM received VM from Vivian Anamika for updated physician notes and PT/OT and physician notes. PT/OT just saw pt and will place notes. Physician notes are pending. Vivian Willson will follow.  LH.

## 2023-07-10 NOTE — PROGRESS NOTES
V2.0    [facility] Progress Note      Name:  Pipo Bui /Age/Sex: 1919  (80 y.o. female)   MRN & CSN:  [id number] & [id number] Encounter Date/Time: 7/10/2023 12:44 PM EDT   [facility]  [facility] PCP: Maggie Guerra MD     [address] [place] [address] Day: 7    Assessment and Recommendations   Pipo Bui is a 80 y.o. female with pmh of hypertension, paroxysmal atrial fibrillation, on flecainide, not on anticoagulation, hearing deficit, currently under hospice care who was initially taken to [facility] ED  who presents with Nondisplaced fracture of base of neck of right femur, initial encounter for closed fracture [facility])      Plan:   Acute nondisplaced right femoral neck fracture:  -Patient status post fall at nursing home   -Patient usually ambulates with a walker, was unable to ambulate which prompted further imaging  -Orthopedic surgeon consulted from the ED, currently recommending splint and no intervention with nonweightbearing for right upper and lower extremity  -Bedrest  -Analgesics as needed    Closed intra-articular fracture of the distal end of the right radius-2023  -Patient has splint in place  -Visible fingers edematous, sensation intact  -CT head, CT C-spine-2023-no acute process. Probable calcified meningioma left frontal region measuring 13 mm    Compression fractures:  -As per care everywhere, patient had lumbar x-ray which revealed compression fracture of the body of the thoracic vertebra, L1 vertebrae, L2 vertebral age-indeterminate    Paroxysmal atrial fibrillation-continue Flecainide, not on anticoagulation  HTN-lisinopril, metoprolol, as needed hydralazine  Chronic normocytic anemia-monitor H&H   Hearing defect  Senile dementia-patient knows her name, date of birth did not know that she is in the hospital but was able to recall her niece and nephew's name. Patient on Depakote.   As per nursing home paperwork patient was recently prescribed 81.5 node noted. Diffuse osteopenia. The left proximal femur appears intact and well seated in the hip joint. There is an acute nondisplaced right femoral neck fracture. Greater trochanter osteophytes are noted. No other acute osseous abnormality is seen. Soft tissues: Aorto iliac atherosclerosis. There is a small degree of heme arthrosis seen in the right hip. Moderate stool volume noted within the rectum. 1. Acute nondisplaced right femoral neck fracture. XR HIP 2-3 VW W PELVIS RIGHT    Result Date: 7/5/2023  EXAMINATION: ONE XRAY VIEW OF THE PELVIS AND TWO XRAY VIEWS RIGHT HIP 7/5/2023 12:03 am COMPARISON: None. HISTORY: ORDERING SYSTEM PROVIDED HISTORY: fall TECHNOLOGIST PROVIDED HISTORY: Reason for exam:->fall Reason for Exam: fall FINDINGS: Diffuse osteopenia. Degenerative changes seen lower lumbar spine. Hip joints appear intact. No fracture or dislocation is seen within the pelvis. Linear lucency noted along the medial aspect of the femoral neck on the pelvis image, with transverse lucent lesion overlying the greater trochanter on the cone down view. 1. Linear lucencies noted involving the region of the femoral neck and greater trochanter for which dedicated CT imaging of the pelvis is recommended to exclude a nondisplaced proximal right femoral fracture. 2. Otherwise no acute fracture is seen.        CBC:   Recent Labs     07/08/23 0127 07/09/23  0150 07/10/23  0537   WBC 6.4  --  7.5   HGB 9.6* 9.8* 10.5*     --  286       BMP:    Recent Labs     07/08/23  0127 07/10/23  0537    139   K 4.1 4.4    103   CO2 23 23   BUN 28* 22   CREATININE 0.7 0.8   GLUCOSE 76 71       Hepatic:   Recent Labs     07/08/23  0127 07/10/23  0537   AST 15 23   ALT 7* 11   BILITOT 0.6 0.6   ALKPHOS 57 68       Lipids: No results found for: CHOL, HDL, TRIG  Hemoglobin A1C: No results found for: LABA1C  TSH: No results found for: TSH  Troponin:   Lab Results   Component Value Date/Time

## 2023-07-10 NOTE — PROGRESS NOTES
Physical Therapy    Physical Therapy Treatment Note  Name: Sirisha Schultz MRN: [id number] :   1919   Date:  7/10/2023   Admission Date: 2023 Room:  [address] [place] [address]-A   Restrictions/Precautions:          NWB RUE and NWB RLE, gentle ROM to RLE  Communication with other providers:  OT, LEE   Subjective:  Patient states: \"This may be the last thing I do\"   Pain:   Location, Type, Intensity (0/10 to 10/10):  denies   Objective:    Observation:    Supine in bed. Cooperative with therapy. Slow to initiate movement and dec carry over with poor memory. Objective Measures:    Stable vitals on room air     Treatment, including education/measures:  Supine to sit: modA x 2 for sequencing and assist to advance BLEs to EOB, hip scooting, and uprighting trunk  Scooting: fwd CGA, laterally maxA from sitting    Squat pivot: modA x 2 from EOB to recliner with VCS/set up assist for RLE/RUE placement to maintain NWB precautions. Transfer performed to the left     Sitting balance: static and light dynamic while managing part of her meal. Able to sit without any UE support x ~8 minutes. SBA to intermittent CGA with posterior lean but no physical assist required to correct balance. Assessment / Impression:    Patient's tolerance of treatment:  good   Adverse Reaction: no  Significant change in status and impact:  no  Barriers to improvement:  activity tolerance, strength, balance, memory, cognition, WB restrictions. Plan for Next Session:    Activity tolerance, strength, balance, transfers, bed mobility   Time in:  1508  Time out:  1532  Timed treatment minutes:  24  Total treatment time:  24 minutes     Previously filed items:  Social/Functional History  Type of Home: Facility (LTC)  Additional Comments: Pt is a questionable historian. Reports she was able to ambulate and perform ADLs. Should confirm with facility.         Short Term Goals  Time Frame for Short Term Goals: 2 weeks  Short Term Goal 1: Pt will perform

## 2023-07-10 NOTE — PROGRESS NOTES
07/10/23 1556   Encounter Summary   Encounter Overview/Reason  Interdisciplinary rounds   Service Provided For: Patient   Referral/Consult From: [address] Family members   Last Encounter  07/10/23  Iban Rouse: Patient was glad to have a  bring her [employer]. I dialogued with her and she told her story of community service and volunteering. I prayed for her and gave her Holy Communion)   Complexity of Encounter Low   Begin Time 1550   End Time  1559   Total Time Calculated 9 min   Encounter    Type Follow up   Spiritual/Emotional needs   Type Spiritual Support   Rituals, Rites and Sacraments   Type Tenriism Communion   Assessment/Intervention/Outcome   Assessment Coping;Peaceful   Intervention Active listening;Nurtured Hope;Prayer (assurance of)/[place]   Outcome Acceptance; Coping;Encouraged;Engaged in conversation;Expressed Gratitude   Plan and Referrals   Plan/Referrals Continue to visit, (comment)

## 2023-07-11 LAB
ALBUMIN SERPL-MCNC: 3.3 GM/DL (ref 3.4–5)
ALP BLD-CCNC: 78 IU/L (ref 40–128)
ALT SERPL-CCNC: 14 U/L (ref 10–40)
ANION GAP SERPL CALCULATED.3IONS-SCNC: 13 MMOL/L (ref 4–16)
AST SERPL-CCNC: 32 IU/L (ref 15–37)
BASOPHILS ABSOLUTE: 0.1 K/CU MM
BASOPHILS RELATIVE PERCENT: 0.6 % (ref 0–1)
BILIRUB SERPL-MCNC: 0.5 MG/DL (ref 0–1)
BUN SERPL-MCNC: 20 MG/DL (ref 6–23)
CALCIUM SERPL-MCNC: 8.5 MG/DL (ref 8.3–10.6)
CHLORIDE BLD-SCNC: 99 MMOL/L (ref 99–110)
CO2: 23 MMOL/L (ref 21–32)
CREAT SERPL-MCNC: 0.7 MG/DL (ref 0.6–1.1)
DIFFERENTIAL TYPE: ABNORMAL
EOSINOPHILS ABSOLUTE: 0.1 K/CU MM
EOSINOPHILS RELATIVE PERCENT: 1.6 % (ref 0–3)
GFR SERPL CREATININE-BSD FRML MDRD: >60 ML/MIN/1.73M2
GLUCOSE SERPL-MCNC: 90 MG/DL (ref 70–99)
HCT VFR BLD CALC: 34.5 % (ref 37–47)
HCT VFR BLD CALC: 36.5 % (ref 37–47)
HEMOGLOBIN: 10.9 GM/DL (ref 12.5–16)
HEMOGLOBIN: 11.5 GM/DL (ref 12.5–16)
IMMATURE NEUTROPHIL %: 1 % (ref 0–0.43)
LYMPHOCYTES ABSOLUTE: 2.2 K/CU MM
LYMPHOCYTES RELATIVE PERCENT: 24.2 % (ref 24–44)
MCH RBC QN AUTO: 27.9 PG (ref 27–31)
MCHC RBC AUTO-ENTMCNC: 31.6 % (ref 32–36)
MCV RBC AUTO: 88.2 FL (ref 78–100)
MONOCYTES ABSOLUTE: 0.7 K/CU MM
MONOCYTES RELATIVE PERCENT: 8.2 % (ref 0–4)
NUCLEATED RBC %: 0 %
PDW BLD-RTO: 13.9 % (ref 11.7–14.9)
PLATELET # BLD: 292 K/CU MM (ref 140–440)
PMV BLD AUTO: 8.5 FL (ref 7.5–11.1)
POTASSIUM SERPL-SCNC: 4 MMOL/L (ref 3.5–5.1)
RBC # BLD: 3.91 M/CU MM (ref 4.2–5.4)
SEGMENTED NEUTROPHILS ABSOLUTE COUNT: 5.7 K/CU MM
SEGMENTED NEUTROPHILS RELATIVE PERCENT: 64.4 % (ref 36–66)
SODIUM BLD-SCNC: 135 MMOL/L (ref 135–145)
TOTAL IMMATURE NEUTOROPHIL: 0.09 K/CU MM
TOTAL NUCLEATED RBC: 0 K/CU MM
TOTAL PROTEIN: 6.9 GM/DL (ref 6.4–8.2)
WBC # BLD: 8.9 K/CU MM (ref 4–10.5)

## 2023-07-11 PROCEDURE — 6360000002 HC RX W HCPCS: Performed by: FAMILY MEDICINE

## 2023-07-11 PROCEDURE — 85014 HEMATOCRIT: CPT

## 2023-07-11 PROCEDURE — 80053 COMPREHEN METABOLIC PANEL: CPT

## 2023-07-11 PROCEDURE — 85018 HEMOGLOBIN: CPT

## 2023-07-11 PROCEDURE — 6360000002 HC RX W HCPCS: Performed by: INTERNAL MEDICINE

## 2023-07-11 PROCEDURE — 1200000000 HC SEMI PRIVATE

## 2023-07-11 PROCEDURE — 2580000003 HC RX 258: Performed by: FAMILY MEDICINE

## 2023-07-11 PROCEDURE — 6370000000 HC RX 637 (ALT 250 FOR IP): Performed by: INTERNAL MEDICINE

## 2023-07-11 PROCEDURE — 2580000003 HC RX 258: Performed by: INTERNAL MEDICINE

## 2023-07-11 PROCEDURE — 36415 COLL VENOUS BLD VENIPUNCTURE: CPT

## 2023-07-11 PROCEDURE — 85025 COMPLETE CBC W/AUTO DIFF WBC: CPT

## 2023-07-11 PROCEDURE — 94761 N-INVAS EAR/PLS OXIMETRY MLT: CPT

## 2023-07-11 RX ADMIN — FLECAINIDE ACETATE 50 MG: 50 TABLET ORAL at 11:12

## 2023-07-11 RX ADMIN — SODIUM CHLORIDE, PRESERVATIVE FREE 10 ML: 5 INJECTION INTRAVENOUS at 21:55

## 2023-07-11 RX ADMIN — THIAMINE HYDROCHLORIDE 100 MG: 100 INJECTION, SOLUTION INTRAMUSCULAR; INTRAVENOUS at 10:59

## 2023-07-11 RX ADMIN — DIVALPROEX SODIUM 250 MG: 250 TABLET, DELAYED RELEASE ORAL at 21:53

## 2023-07-11 RX ADMIN — ENOXAPARIN SODIUM 30 MG: 100 INJECTION SUBCUTANEOUS at 10:59

## 2023-07-11 RX ADMIN — SODIUM CHLORIDE, PRESERVATIVE FREE 10 ML: 5 INJECTION INTRAVENOUS at 10:57

## 2023-07-11 RX ADMIN — METOPROLOL TARTRATE 12.5 MG: 25 TABLET, FILM COATED ORAL at 21:54

## 2023-07-11 RX ADMIN — FLECAINIDE ACETATE 50 MG: 50 TABLET ORAL at 21:53

## 2023-07-11 RX ADMIN — METOPROLOL TARTRATE 12.5 MG: 25 TABLET, FILM COATED ORAL at 10:59

## 2023-07-11 RX ADMIN — MELATONIN TAB 3 MG 3 MG: 3 TAB at 21:54

## 2023-07-11 RX ADMIN — LISINOPRIL 10 MG: 5 TABLET ORAL at 10:59

## 2023-07-11 RX ADMIN — CEFTRIAXONE SODIUM 1000 MG: 1 INJECTION, POWDER, FOR SOLUTION INTRAMUSCULAR; INTRAVENOUS at 21:53

## 2023-07-11 ASSESSMENT — PAIN SCALES - GENERAL
PAINLEVEL_OUTOF10: 0

## 2023-07-11 NOTE — PLAN OF CARE
Problem: Discharge Planning  Goal: Discharge to home or other facility with appropriate resources  Outcome: Progressing     Problem: Safety - Adult  Goal: Free from fall injury  Outcome: Progressing     Problem: Skin/Tissue Integrity  Goal: Absence of new skin breakdown  Description: 1. Monitor for areas of redness and/or skin breakdown  2. Assess vascular access sites hourly  3. Every 4-6 hours minimum:  Change oxygen saturation probe site  4. Every 4-6 hours:  If on nasal continuous positive airway pressure, respiratory therapy assess nares and determine need for appliance change or resting period.   Outcome: Progressing     Problem: ABCDS Injury Assessment  Goal: Absence of physical injury  Outcome: Progressing     Problem: Pain  Goal: Verbalizes/displays adequate comfort level or baseline comfort level  Outcome: Progressing  Flowsheets (Taken 7/10/2023 1145 by Florentin Shah LPN)  Verbalizes/displays adequate comfort level or baseline comfort level: Encourage patient to monitor pain and request assistance

## 2023-07-11 NOTE — CARE COORDINATION
This RN CM spoke with Liliam Gomez with [employer]. Precert was initiated this morning. Liliam Gomez will advise this RN of determination once it has been received form [facility]. LEE will continue to follow.

## 2023-07-11 NOTE — PROGRESS NOTES
Comprehensive Nutrition Assessment    Type and Reason for Visit:  Initial, RD Nutrition Re-Screen/LOS    Nutrition Recommendations/Plan:   Trial Easy to [employer]   Start Standard High Calorie/High Protein oral nutrition supplements daily   Assist feed and meal set up as henrique   Monitor PO intakes, GI status, weights, fluids, labs, lytes, glucose, and plan of care      Malnutrition Assessment:  Malnutrition Status:  Mild malnutrition (At risk for malnutrition) (07/11/23 8169)    Context:  Acute Illness     Findings of the 6 clinical characteristics of malnutrition:  Energy Intake:  Mild decrease in energy intake (Comment)  Weight Loss:  Unable to assess     Body Fat Loss:  Mild body fat loss Orbital, Triceps, Buccal region   Muscle Mass Loss:  Mild muscle mass loss Temples (temporalis)  Fluid Accumulation:  Unable to assess     Strength:  Not Performed    Nutrition Assessment:    Patient is a 79 y/o female, admitted with Acute nondisplaced right femoral neck fracture s/p fall at nursing home 6/29, non-surgical intervention, splint placed. H/O Senile dementia, HTN, Anemia. Currently pt on low sodium diet, consumed 25% of meal at visit with assist feed. Reduced intakes likely r/t to advanced aging. PO intakes of 1-100% recorded per flowsheet. Pt pleasantly confused at visit, asking for current location. No longer on oxygen support. Denies n/v/p. Denies wt loss, limited weight hx, pt is the poor diet historian. Performed NFPE, some fat wasting s/s advanced age. Follow as moderate nutrition risk. Nutrition Related Findings:    Sitting up in chair, would benefit from assist meal set up and feed; Meds and labs reviewed Wound Type: None       Current Nutrition Intake & Therapies:    Average Meal Intake: 1-25%, 26-50%, 51-75%, % (inconsistent intakes during stay)  Average Supplements Intake: None Ordered  ADULT DIET; Regular;  Low Sodium (2 gm)    Anthropometric Measures:  Height: 5' (152.4 cm)  Ideal Body

## 2023-07-11 NOTE — PROGRESS NOTES
rate.  Respiratory: Clear to auscultation  Gastrointestinal: Soft, non tender  Genitourinary: no suprapubic tenderness  Musculoskeletal: RUE edema  Skin: warm, dry  Neuro: Alert. Psych: Mood appropriate. Medications:   Medications:    cefTRIAXone (ROCEPHIN) IV  1,000 mg IntraVENous Q24H    sodium chloride flush  5-40 mL IntraVENous 2 times per day    enoxaparin  30 mg SubCUTAneous Daily    divalproex  250 mg Oral Nightly    flecainide  50 mg Oral BID    lisinopril  10 mg Oral Daily    metoprolol tartrate  12.5 mg Oral BID    melatonin  3 mg Oral Nightly    thiamine  100 mg IntraVENous Daily      Infusions:    sodium chloride 100 mL/hr at 07/10/23 2214     PRN Meds: sodium chloride flush, 5-40 mL, PRN  sodium chloride, , PRN  ondansetron, 4 mg, Q8H PRN   Or  ondansetron, 4 mg, Q6H PRN  polyethylene glycol, 17 g, Daily PRN  acetaminophen, 650 mg, Q6H PRN   Or  acetaminophen, 650 mg, Q6H PRN  calcium carbonate, 1 tablet, 4x Daily PRN  polyvinyl alcohol, 1 drop, Q4H PRN  hydrALAZINE, 5 mg, Q6H PRN        Labs and Imaging   CT PELVIS WO CONTRAST Additional Contrast? None    Result Date: 7/5/2023  EXAMINATION: CT OF THE PELVIS WITHOUT CONTRAST 7/5/2023 1:04 am TECHNIQUE: CT of the pelvis was performed without the administration of intravenous contrast.  Multiplanar reformatted images are provided for review. Adjustment of mA and/or kV according to patient size was utilized. Automated exposure control, iterative reconstruction, and/or weight based adjustment of the mA/kV was utilized to reduce the radiation dose to as low as reasonably achievable. COMPARISON: Radiographs the same day.  HISTORY ORDERING SYSTEM PROVIDED HISTORY: fall TECHNOLOGIST PROVIDED HISTORY: Reason for exam:->fall Additional Contrast?->None Decision Support Exception - unselect if not a suspected or confirmed emergency medical condition->Emergency Medical Condition (MA) Reason for Exam: fall FINDINGS: Osseous structures: Degenerative changes

## 2023-07-11 NOTE — PROGRESS NOTES
APRN notified of UA results. Urine dipstick shows positive for RBC's, positive for protein, positive for nitrates, positive for leukocytes, and positive for ketones. Micro exam: 298 WBC's per HPF, 122 RBC's per HPF, and MANY + bacteria. Urine culture pending. Will start IV Rocephin Q24 hrs pending culture. JENNIFER Heath Shoulder updated.

## 2023-07-12 LAB
ALBUMIN SERPL-MCNC: 3 GM/DL (ref 3.4–5)
ALP BLD-CCNC: 74 IU/L (ref 40–128)
ALT SERPL-CCNC: 14 U/L (ref 10–40)
ANION GAP SERPL CALCULATED.3IONS-SCNC: 11 MMOL/L (ref 4–16)
AST SERPL-CCNC: 30 IU/L (ref 15–37)
BASOPHILS ABSOLUTE: 0 K/CU MM
BASOPHILS RELATIVE PERCENT: 0.5 % (ref 0–1)
BILIRUB SERPL-MCNC: 0.3 MG/DL (ref 0–1)
BUN SERPL-MCNC: 21 MG/DL (ref 6–23)
CALCIUM SERPL-MCNC: 8.4 MG/DL (ref 8.3–10.6)
CHLORIDE BLD-SCNC: 103 MMOL/L (ref 99–110)
CO2: 23 MMOL/L (ref 21–32)
CREAT SERPL-MCNC: 0.7 MG/DL (ref 0.6–1.1)
CULTURE: ABNORMAL
DIFFERENTIAL TYPE: ABNORMAL
EOSINOPHILS ABSOLUTE: 0.1 K/CU MM
EOSINOPHILS RELATIVE PERCENT: 1.3 % (ref 0–3)
GFR SERPL CREATININE-BSD FRML MDRD: >60 ML/MIN/1.73M2
GLUCOSE SERPL-MCNC: 92 MG/DL (ref 70–99)
HCT VFR BLD CALC: 30.7 % (ref 37–47)
HEMOGLOBIN: 9.9 GM/DL (ref 12.5–16)
IMMATURE NEUTROPHIL %: 0.8 % (ref 0–0.43)
LYMPHOCYTES ABSOLUTE: 1.8 K/CU MM
LYMPHOCYTES RELATIVE PERCENT: 21.2 % (ref 24–44)
Lab: ABNORMAL
MCH RBC QN AUTO: 28.3 PG (ref 27–31)
MCHC RBC AUTO-ENTMCNC: 32.2 % (ref 32–36)
MCV RBC AUTO: 87.7 FL (ref 78–100)
MONOCYTES ABSOLUTE: 0.8 K/CU MM
MONOCYTES RELATIVE PERCENT: 10 % (ref 0–4)
NUCLEATED RBC %: 0 %
PDW BLD-RTO: 14 % (ref 11.7–14.9)
PLATELET # BLD: 259 K/CU MM (ref 140–440)
PMV BLD AUTO: 8.5 FL (ref 7.5–11.1)
POTASSIUM SERPL-SCNC: 4.2 MMOL/L (ref 3.5–5.1)
RBC # BLD: 3.5 M/CU MM (ref 4.2–5.4)
SEGMENTED NEUTROPHILS ABSOLUTE COUNT: 5.5 K/CU MM
SEGMENTED NEUTROPHILS RELATIVE PERCENT: 66.2 % (ref 36–66)
SODIUM BLD-SCNC: 137 MMOL/L (ref 135–145)
SPECIMEN: ABNORMAL
TOTAL IMMATURE NEUTOROPHIL: 0.07 K/CU MM
TOTAL NUCLEATED RBC: 0 K/CU MM
TOTAL PROTEIN: 6 GM/DL (ref 6.4–8.2)
WBC # BLD: 8.3 K/CU MM (ref 4–10.5)

## 2023-07-12 PROCEDURE — 6370000000 HC RX 637 (ALT 250 FOR IP): Performed by: INTERNAL MEDICINE

## 2023-07-12 PROCEDURE — 6360000002 HC RX W HCPCS: Performed by: INTERNAL MEDICINE

## 2023-07-12 PROCEDURE — 80053 COMPREHEN METABOLIC PANEL: CPT

## 2023-07-12 PROCEDURE — 85025 COMPLETE CBC W/AUTO DIFF WBC: CPT

## 2023-07-12 PROCEDURE — 6360000002 HC RX W HCPCS: Performed by: FAMILY MEDICINE

## 2023-07-12 PROCEDURE — 1200000000 HC SEMI PRIVATE

## 2023-07-12 PROCEDURE — 97116 GAIT TRAINING THERAPY: CPT

## 2023-07-12 PROCEDURE — 6370000000 HC RX 637 (ALT 250 FOR IP): Performed by: NURSE PRACTITIONER

## 2023-07-12 PROCEDURE — 97530 THERAPEUTIC ACTIVITIES: CPT

## 2023-07-12 PROCEDURE — 2580000003 HC RX 258: Performed by: FAMILY MEDICINE

## 2023-07-12 PROCEDURE — 2580000003 HC RX 258: Performed by: NURSE PRACTITIONER

## 2023-07-12 PROCEDURE — 94761 N-INVAS EAR/PLS OXIMETRY MLT: CPT

## 2023-07-12 PROCEDURE — 36415 COLL VENOUS BLD VENIPUNCTURE: CPT

## 2023-07-12 PROCEDURE — 97535 SELF CARE MNGMENT TRAINING: CPT

## 2023-07-12 RX ORDER — IBUPROFEN 200 MG
TABLET ORAL 2 TIMES DAILY
Status: DISCONTINUED | OUTPATIENT
Start: 2023-07-12 | End: 2023-07-14 | Stop reason: HOSPADM

## 2023-07-12 RX ORDER — SODIUM CHLORIDE 9 MG/ML
INJECTION, SOLUTION INTRAVENOUS CONTINUOUS
Status: DISPENSED | OUTPATIENT
Start: 2023-07-12 | End: 2023-07-13

## 2023-07-12 RX ADMIN — POLYVINYL ALCOHOL 1 DROP: 14 SOLUTION/ DROPS OPHTHALMIC at 09:12

## 2023-07-12 RX ADMIN — MELATONIN TAB 3 MG 3 MG: 3 TAB at 22:15

## 2023-07-12 RX ADMIN — BACITRACIN ZINC, NEOMYCIN SULFATE, POLYMYXIN B SULFATE 1 G: 3.5; 5000; 4 OINTMENT TOPICAL at 23:55

## 2023-07-12 RX ADMIN — SODIUM CHLORIDE: 9 INJECTION, SOLUTION INTRAVENOUS at 09:10

## 2023-07-12 RX ADMIN — ENOXAPARIN SODIUM 30 MG: 100 INJECTION SUBCUTANEOUS at 09:12

## 2023-07-12 RX ADMIN — FLECAINIDE ACETATE 50 MG: 50 TABLET ORAL at 09:11

## 2023-07-12 RX ADMIN — THIAMINE HYDROCHLORIDE 100 MG: 100 INJECTION, SOLUTION INTRAMUSCULAR; INTRAVENOUS at 10:24

## 2023-07-12 RX ADMIN — METOPROLOL TARTRATE 12.5 MG: 25 TABLET, FILM COATED ORAL at 22:14

## 2023-07-12 RX ADMIN — FLECAINIDE ACETATE 50 MG: 50 TABLET ORAL at 22:15

## 2023-07-12 RX ADMIN — CEFTRIAXONE SODIUM 1000 MG: 1 INJECTION, POWDER, FOR SOLUTION INTRAMUSCULAR; INTRAVENOUS at 22:16

## 2023-07-12 RX ADMIN — DIVALPROEX SODIUM 250 MG: 250 TABLET, DELAYED RELEASE ORAL at 22:15

## 2023-07-12 ASSESSMENT — PAIN SCALES - GENERAL: PAINLEVEL_OUTOF10: 0

## 2023-07-12 NOTE — PROGRESS NOTES
V2.0    [facility] [place]      Name:  David Calzada /Age/Sex: 1919  (80 y.o. female)   MRN & CSN:  [id number] & [id number] Encounter Date/Time: 2023 12:44 PM EDT   Location:  [facility]A PCP: Carine Mendez MD     Attending:Bettie Corona, [address] [address] Day: 9    Assessment and Recommendations   David Calzada is a 80 y.o. female with pmh of hypertension, paroxysmal atrial fibrillation, on flecainide, not on anticoagulation, hearing deficit, currently under hospice care who was initially taken to [facility] ED  who presents with Nondisplaced fracture of base of neck of right femur, initial encounter for closed fracture [facility])      Plan:   Acute nondisplaced right femoral neck fracture  Patient status post fall at nursing home   Patient usually ambulates with a walker, was unable to ambulate which prompted further imaging  Orthopedic surgeon consulted from the ED, currently recommending splint and no intervention with nonweightbearing for right upper and lower extremity  Bedrest  Analgesics as needed  Disposition: Pending placement  Precertification to [address] [place] [address] and rehab-medically stable for discharge  NS 50ml/ hr d/t poor PO intake     Closed intra-articular fracture of the distal end of the right radius-2023  Patient has splint in place  + edema, N/V checks   CT head, CT C-spine-2023-no acute process. Probable calcified meningioma left frontal region measuring 13 mm    Acute cystitis-suspected  Reports history of frequent UTIs  UA suggestive of infection  Urine culture with E. coli/Proteus  Rocephin x5 days and will convert to oral regimen for 10 days total on discharge    Right groin wound   2/2 pure wick suction system  Daily wound care  Bacitracin ointment     Compression fractures:   As per care everywhere, patient had lumbar x-ray which revealed compression fracture of the body of the thoracic vertebra, L1 vertebrae, L2 vertebral age-indeterminate    Paroxysmal

## 2023-07-12 NOTE — PROGRESS NOTES
Occupational Therapy    Occupational Therapy Treatment Note    Name: Janey Gonsalez MRN: [id number] :   1919   Date:  2023   Admission Date: 2023 Room:  [address] [place] [address]     Primary Problem: Rt subcapital femoral neck fracture, Rt distal radius fracture     Restrictions/Precautions: General Precautions, Fall Risk, NWB Rt UE and Rt LE, Telemetry, Pulse Ox, BP cuff, Bed/chair alarm    Communication with other providers: ISRA Srinivasan    Subjective:  Patient states: \"A warm blanket or a sweater would be nice. \"  Pain: Pt denied pain this date    Objective:    Observation: Pt received in supine upon OT arrival. Pleasant and agreeable to treatment. Objective Measures: Disoriented to time/place/situation    Treatment, including education:  Therapeutic Activity Training:   Therapeutic activity training was instructed today. Cues were given for safety, sequence, UE/LE placement, awareness, and balance. Self Care Training:   Cues were given for safety, sequence, UE/LE placement, visual cues, and balance. Pt received in supine upon OT arrival. Pt re-educated on role of OT, POC, NWB status Rt UE/LE, and importance of EOB/OOB activity. Pt transferred supine to sitting EOB mod A x 2 with HOB elevated to 30'. Pt able to sit at EOB level CGA progressing to SBA for static sitting balance. Pt continued to be dependent for donning BL socks seated EOB. Pt ate bites of ice cream seated EOB min A with setup using Lt hand. Pt required assist for initial placement of ice cream on spoon due to being excessively frozen but pt able to grasp spoon and bring to mouth without assist. Chair positioned adjacent to bed, and pt completed stand pivot transfer bed to chair mod A x 2 on Lt LE with max cues for maintaining NWB status Rt UE and LE. Seated at chair level, pt completed oral hygiene task of brushing teeth and facial hygiene task SBA with setup using Lt hand.  Pt required setup for opening toothbrush package,

## 2023-07-12 NOTE — PROGRESS NOTES
Physical Therapy  Name: Deny Gotti MRN: [id number] :   1919   Date:  2023   Admission Date: 2023 Room:  [address] [place] [address]   Restrictions/Precautions:        NWB RUE and NWB RLE, gentle ROM to RLE    Communication with other providers:  Emmett Mercer RN states pt is ok to see for therapy. COTX with OT Forrest per patient tolerance and safety with rehab     Subjective:  Patient states:  \"Ice cream? For breakfast?\" Patient needs assist for orientation for time of day  Pain:   Location, Type, Intensity (0/10 to 10/10):  denies at rest, hip pain with exercise does not     Objective:    Observation:  Patient is supine upon arrival.     Treatment, including education/measures:    Transfers with line management of Tele Monitor, BP Cuff   Supine to sit :Mod A x 2. Assist for trunk and assist for BLE completion to EOB  Seated balance : CGA-Min A during occupational care on EOB. Patient needs occasional cues for LE positioning for GIOVANNI on floor to decrease posterior lean. ~10' of activities on EOB  Scooting :Seated scooting Min A to EOB  SPT:Mod A x 2 squat pivot. Assist for pivot and steadying from EOB to recliner    Exercises  Sitting Exercises: Ankle pumps x 10, visual cues needed and minimal cues  LAQ's x 10 on ea side   Mini Hip Abd x 2-3 with patient expressing hip pain    Therapeutic exercises were instructed today. Cues were given for technique, safety, recruitment, and rationale. Cues were verbal and/or tactile. Safety  Patient left safely in the recliner, with call light/phone in reach with alarm applied. Gait belt and mask were used for transfers and gait.     Assessment / Impression:     Patient's tolerance of treatment:  good   Adverse Reaction: pain with exercise  Significant change in status and impact:  none  Barriers to improvement:  WB status, cognition     Plan for Next Session:    Will cont to work towards pt's goals per patient tolerance  Time in:  1530  Time out:  1559  Timed treatment minutes:

## 2023-07-13 LAB
HCT VFR BLD CALC: 31 % (ref 37–47)
HEMOGLOBIN: 9.8 GM/DL (ref 12.5–16)

## 2023-07-13 PROCEDURE — 1200000000 HC SEMI PRIVATE

## 2023-07-13 PROCEDURE — 2580000003 HC RX 258: Performed by: INTERNAL MEDICINE

## 2023-07-13 PROCEDURE — 2580000003 HC RX 258: Performed by: NURSE PRACTITIONER

## 2023-07-13 PROCEDURE — 36415 COLL VENOUS BLD VENIPUNCTURE: CPT

## 2023-07-13 PROCEDURE — 6370000000 HC RX 637 (ALT 250 FOR IP): Performed by: INTERNAL MEDICINE

## 2023-07-13 PROCEDURE — 85014 HEMATOCRIT: CPT

## 2023-07-13 PROCEDURE — 85018 HEMOGLOBIN: CPT

## 2023-07-13 PROCEDURE — 6370000000 HC RX 637 (ALT 250 FOR IP): Performed by: NURSE PRACTITIONER

## 2023-07-13 PROCEDURE — 6360000002 HC RX W HCPCS: Performed by: FAMILY MEDICINE

## 2023-07-13 PROCEDURE — 6360000002 HC RX W HCPCS: Performed by: INTERNAL MEDICINE

## 2023-07-13 PROCEDURE — 2580000003 HC RX 258: Performed by: FAMILY MEDICINE

## 2023-07-13 RX ORDER — LANOLIN ALCOHOL/MO/W.PET/CERES
100 CREAM (GRAM) TOPICAL DAILY
Status: DISCONTINUED | OUTPATIENT
Start: 2023-07-14 | End: 2023-07-14 | Stop reason: HOSPADM

## 2023-07-13 RX ADMIN — METOPROLOL TARTRATE 12.5 MG: 25 TABLET, FILM COATED ORAL at 11:01

## 2023-07-13 RX ADMIN — SODIUM CHLORIDE, PRESERVATIVE FREE 10 ML: 5 INJECTION INTRAVENOUS at 21:40

## 2023-07-13 RX ADMIN — DIVALPROEX SODIUM 250 MG: 250 TABLET, DELAYED RELEASE ORAL at 21:28

## 2023-07-13 RX ADMIN — THIAMINE HYDROCHLORIDE 100 MG: 100 INJECTION, SOLUTION INTRAMUSCULAR; INTRAVENOUS at 11:27

## 2023-07-13 RX ADMIN — CEFTRIAXONE SODIUM 1000 MG: 1 INJECTION, POWDER, FOR SOLUTION INTRAMUSCULAR; INTRAVENOUS at 22:18

## 2023-07-13 RX ADMIN — MELATONIN TAB 3 MG 3 MG: 3 TAB at 21:28

## 2023-07-13 RX ADMIN — BACITRACIN ZINC, NEOMYCIN SULFATE, POLYMYXIN B SULFATE 1 G: 3.5; 5000; 4 OINTMENT TOPICAL at 21:28

## 2023-07-13 RX ADMIN — FLECAINIDE ACETATE 50 MG: 50 TABLET ORAL at 11:02

## 2023-07-13 RX ADMIN — ENOXAPARIN SODIUM 30 MG: 100 INJECTION SUBCUTANEOUS at 11:00

## 2023-07-13 RX ADMIN — SODIUM CHLORIDE, PRESERVATIVE FREE 10 ML: 5 INJECTION INTRAVENOUS at 11:03

## 2023-07-13 RX ADMIN — SODIUM CHLORIDE 25 ML: 9 INJECTION, SOLUTION INTRAVENOUS at 22:16

## 2023-07-13 RX ADMIN — METOPROLOL TARTRATE 12.5 MG: 25 TABLET, FILM COATED ORAL at 21:28

## 2023-07-13 RX ADMIN — FLECAINIDE ACETATE 50 MG: 50 TABLET ORAL at 22:13

## 2023-07-13 RX ADMIN — LISINOPRIL 10 MG: 5 TABLET ORAL at 11:00

## 2023-07-13 RX ADMIN — SODIUM CHLORIDE: 9 INJECTION, SOLUTION INTRAVENOUS at 05:48

## 2023-07-13 ASSESSMENT — PAIN SCALES - WONG BAKER

## 2023-07-13 ASSESSMENT — PAIN SCALES - GENERAL
PAINLEVEL_OUTOF10: 0

## 2023-07-13 NOTE — PROGRESS NOTES
I personally saw the patient and performed a substantive portion of the visit including all aspects of the medical decision making. Patient states she has lots of problems, but doesn't want to mention any because she thinks no one cares. No acute distress  S1s2 rrr  Cta b/l bs+  Abd soft nt nd  Right upper extremity appears wrapped, fingers do have some edema    -Conservative management for her right femoral neck fracture  - Radial distal end fracture: Family requesting orthopedic surgery to evaluate to see if patient needs a cast since it has been 1 week since patient was last seen by orthopedic surgery. Reconsulting orthopedic surgery. - Urine culture showed E. coli and Proteus which appears to be susceptible to Bactrim.

## 2023-07-13 NOTE — PROGRESS NOTES
V2.0    [facility] [place]      Name:  Rashad Herrmann /Age/Sex: 1919  (80 y.o. female)   MRN & CSN:  [id number] & [id number] Encounter Date/Time: 2023 12:44 PM EDT   Location:  [facility]/1109-A PCP: Siria Paz MD     Attending:Bettie Corona, [address] [place] [address] Day: 10    Assessment and Recommendations   Rashad Herrmann is a 80 y.o. female with pmh of hypertension, paroxysmal atrial fibrillation, on flecainide, not on anticoagulation, hearing deficit, currently under hospice care who was initially taken to [facility] ED  who presents with Nondisplaced fracture of base of neck of right femur, initial encounter for closed fracture [facility])      Plan:   Acute nondisplaced right femoral neck fracture  Patient status post fall at nursing home   Patient usually ambulates with a walker, was unable to ambulate which prompted further imaging  Orthopedic surgeon consulted from the ED, no surgical intervention. Nonweightbearing for 6 weeks. Gentle range of motion exercises and transfers. Mobility from bed to chair  Analgesics as needed  Disposition: Pending placement  Precertification to [address] [place] [address] and rehab-medically stable for discharge  NS 50ml/ hr d/t poor PO intake     Closed intra-articular fracture of the distal end of the right radius-2023  Patient has splint in place  + edema, N/V checks   CT head, CT C-spine-2023-no acute process. Probable calcified meningioma left frontal region measuring 13 mm  Reconsulting orthopedic-family requesting reevaluation for casting    Acute cystitis-suspected  Reports history of frequent UTIs  UA suggestive of infection  Urine culture with E. coli/Proteus  Rocephin x5 days and will convert to oral regimen for 10 days total on discharge    Right groin wound   2/2 pure wick suction system  Daily wound care  Bacitracin ointment     Compression fractures:   As per care everywhere, patient had lumbar x-ray which revealed compression fracture of the body of 0.012 05/15/2021 03:30 PM    TROPONINT 0.012 05/15/2021 12:30 PM     Lactic Acid: No results for input(s): LACTA in the last 72 hours. BNP: No results for input(s): PROBNP in the last 72 hours.   UA:  Lab Results   Component Value Date/Time    NITRU POSITIVE 07/10/2023 05:00 PM    COLORU YELLOW 07/10/2023 05:00 PM    WBCUA 399 07/10/2023 05:00 PM    RBCUA 122 07/10/2023 05:00 PM    MUCUS RARE 07/10/2023 05:00 PM    TRICHOMONAS NONE SEEN 07/10/2023 05:00 PM    BACTERIA MANY 07/10/2023 05:00 PM    CLARITYU CLOUDY 07/10/2023 05:00 PM    SPECGRAV 1.020 07/10/2023 05:00 PM    LEUKOCYTESUR LARGE NUMBER OR AMOUNT OBSERVED 07/10/2023 05:00 PM    UROBILINOGEN 1.0 07/10/2023 05:00 PM    BILIRUBINUR NEGATIVE 07/10/2023 05:00 PM    BLOODU LARGE NUMBER OR AMOUNT OBSERVED 07/10/2023 05:00 PM    KETUA >80 07/10/2023 05:00 PM     Urine Cultures: No results found for: Jannette Opitz  Blood Cultures: No results found for: BC  No results found for: BLOODCULT2  Organism:   Lab Results   Component Value Date/Time    ORG ECOL 12/24/2018 06:45 PM         Electronically signed by YEMI Zavaleta CNP on 7/13/2023 at 12:33 PM

## 2023-07-13 NOTE — PROGRESS NOTES
Physical Therapy  Attempted to see pt, pt is confused per nursing, attempted to se pt, pt was sound asleep. Will cont. Gael Giordano.  Nitza Robles PTA

## 2023-07-13 NOTE — PROGRESS NOTES
IV TO PO EVALUATION  -Patients considered for IV to PO conversion should meet all of the   following inclusion criteria and none of the exclusion criteria. MEDICATION(S) CONSIDERED FOR CONVERSION:  -Thiamine    EXCLUSION CRITERIA:  -Criteria that the patient is NOT a candidate for conversion. .. [] Infections requiring IV therapy  [] Recent therapeutic failure on oral formulation  [] Meningitis  [] Osteomyelitis  [] Other  [] Patients with unreliable response to oral medication  [] Nausea and/or vomiting or diarrhea within previous 24 hours  [] Malabsorption disorders  [] Motility disorders of GI tract including ileus or bowel obstruction  [] Patients who cannot use oral route  [] Painful oral ulcerations  [] Unable to swallow  [] NPO  [] Clinical deterioration requiring vasopressor therapy for blood pressure support  [] Active bleeding (Proton Pump Inhibitors/H2 receptor blockers only)  [x] NO EXCLUSION CRITERIA NOTED      INCLUSION CRITERIA:   -Criteria to initiate medication route switch. .. [x] No exclusion criteria met as described above  [x] IV therapy for >24 hours, afebrile, improving trend in WBC (antibiotics only)  [x] Tolerating oral diet, may include  Clear liquids >1000 mL/day  Tube feeds without high residuals (<150 mL) at least 40 mL/hr  [x] Tolerating oral medications  [] No seizures for 72 hours (antiepileptic medications only)    Please note, patients may be given suppositories when available for ordered product if no contraindications exist (ie. .. rectal surgery or infection). Oral solutions/suspensions may be considered for patients with a functioning enteral tube not on continuous suction, if available.     MEDICATION(S) TO BE CONVERTED:  -Thiamine 100 mg IV daily converted to thiamine 100 mg by mouth daily    Cam Prince, [place], [facility], BCPS  7/13/2023 4:00 PM

## 2023-07-14 VITALS
TEMPERATURE: 98.6 F | HEIGHT: 60 IN | HEART RATE: 75 BPM | BODY MASS INDEX: 25.54 KG/M2 | RESPIRATION RATE: 23 BRPM | DIASTOLIC BLOOD PRESSURE: 70 MMHG | SYSTOLIC BLOOD PRESSURE: 138 MMHG | WEIGHT: 130.07 LBS | OXYGEN SATURATION: 97 %

## 2023-07-14 LAB
SARS-COV-2 RDRP RESP QL NAA+PROBE: NOT DETECTED
SOURCE: NORMAL

## 2023-07-14 PROCEDURE — 2580000003 HC RX 258: Performed by: INTERNAL MEDICINE

## 2023-07-14 PROCEDURE — 97530 THERAPEUTIC ACTIVITIES: CPT

## 2023-07-14 PROCEDURE — 87635 SARS-COV-2 COVID-19 AMP PRB: CPT

## 2023-07-14 PROCEDURE — 6370000000 HC RX 637 (ALT 250 FOR IP): Performed by: INTERNAL MEDICINE

## 2023-07-14 PROCEDURE — 97535 SELF CARE MNGMENT TRAINING: CPT

## 2023-07-14 PROCEDURE — 6360000002 HC RX W HCPCS: Performed by: INTERNAL MEDICINE

## 2023-07-14 RX ORDER — SULFAMETHOXAZOLE AND TRIMETHOPRIM 800; 160 MG/1; MG/1
1 TABLET ORAL 2 TIMES DAILY
Qty: 6 TABLET | Refills: 0 | Status: SHIPPED | OUTPATIENT
Start: 2023-07-14 | End: 2023-07-17

## 2023-07-14 RX ADMIN — ENOXAPARIN SODIUM 30 MG: 100 INJECTION SUBCUTANEOUS at 08:46

## 2023-07-14 RX ADMIN — LISINOPRIL 10 MG: 5 TABLET ORAL at 08:44

## 2023-07-14 RX ADMIN — METOPROLOL TARTRATE 12.5 MG: 25 TABLET, FILM COATED ORAL at 08:45

## 2023-07-14 RX ADMIN — SODIUM CHLORIDE, PRESERVATIVE FREE 10 ML: 5 INJECTION INTRAVENOUS at 08:50

## 2023-07-14 RX ADMIN — Medication 100 MG: at 08:46

## 2023-07-14 RX ADMIN — FLECAINIDE ACETATE 50 MG: 50 TABLET ORAL at 08:46

## 2023-07-14 ASSESSMENT — PAIN SCALES - GENERAL
PAINLEVEL_OUTOF10: 0

## 2023-07-14 ASSESSMENT — PAIN SCALES - WONG BAKER
WONGBAKER_NUMERICALRESPONSE: 0

## 2023-07-14 NOTE — PROGRESS NOTES
Physical Therapy    Physical Therapy Treatment Note  Name: Rolf Urban MRN: [id number] :   1919   Date:  2023   Admission Date: 2023 Room:  [address] [place] [address]   Restrictions/Precautions:          NWB RUE/RLE  Communication with other providers:  RN  Subjective:  Patient states:  \"I can't hear a thing\"   Pain:   Location, Type, Intensity (0/10 to 10/10):  denies   Objective:    Observation:    Supine in bed. Sleeping but easy to arouse. Irritable this morning and less cooperative with therapy. Hearing aids not found and pt did not care to find them or put them in. Confused and disoriented to place, time, and situation. Second session pt was supine in bed. Much more agreeable and cooperative to work with therapy. Objective Measures:    Stable vitals on room air     Treatment, including education/measures:  1st session:  Pt was assisted with scooting supine to HOB and laterally in the bed for better positioning to eat her meal. Pt positioned in semi chair position to eat due to pt declining/becoming irritable with talking about sitting on EOB/in recliner chair for her meal. Assisted pt with hand over hand assist to take a few bites of her breakfast. Limited use of RUE due to inc swelling and difficulty keeping grasp of spoon and dec coordination when attempting to use LUE.     2nd session:  Supine to sit: modA for partial guidance of BLEs and uprighting trunk. Inc time and effort to scoot hips laterally. Short distances with LE advancement. Scooting: fwd to EOB and laterally from sitting maxA for lateral weight shift and hip advancement. Sitting balance: SBA for safety at EOB, static with LUE support. VCS for non use of RUE. CGA to SBA while managing some of her ice cream at EOB without UE support. Squat pivot: modA x 2 for fwd weight shift, lift, and sequencing turn. Set up assist of BLEs to and cues for weight shift to maintain NWB precautions.       Educated pt on POC, role of PT,

## 2023-07-14 NOTE — CARE COORDINATION
Patient has been approved to go to [facility]. Will need rapid covid and ITAL[place] completed. PS to provider.

## 2023-07-14 NOTE — PROGRESS NOTES
07/14/23 1054   Encounter Summary   Encounter Overview/Reason  [place] Encounter   Service Provided For: Patient and family together   Referral/Consult From: [address] Family members   Last Encounter  07/14/23  Tracy Clark: Patient had the company of her niece. I listened to her and prayed for her. I gave her Holy Communion.  She was graeful for the visit and the Communion.)   Complexity of Encounter Low   Begin Time 1030   End Time  1050   Total Time Calculated 20 min   Encounter    Type Follow up   Spiritual/Emotional needs   Type Spiritual Support   Rituals, Rites and Sacraments   Type [employer] Communion   Assessment/Intervention/Outcome   Assessment Calm;Coping;Peaceful   Intervention Active listening;Nurtured Hope;Prayer (assurance of)/[place]   Outcome Comfort;Coping;Encouraged;Engaged in conversation;Expressed Gratitude;Peace   Plan and Referrals   Plan/Referrals Continue to visit, (comment)

## 2023-07-14 NOTE — DISCHARGE SUMMARY
V2.0  Discharge Summary    Name:  Luis Antonio Herron /Age/Sex: 1919 (89 y.o. female)   Admit Date: 2023  Discharge Date: 23    MRN & CSN:  [id number] & [id number] Encounter Date and Time 23 1:54 PM EDT    Attending:  Jt Davis MD Discharging Provider: Kinsey Daly, APRN - [id number] [facility] Course:     Brief HPI: Luis Antonio Herron is a 80 y.o. female who presented with pmh of hypertension, paroxysmal atrial fibrillation, on flecainide, not on anticoagulation, hearing deficit, currently under hospice care who was initially taken to [facility] ED  who presents with Nondisplaced fracture of base of neck of right femur, initial encounter for closed fracture [facility])    Brief Problem Based Course:   Acute nondisplaced right femoral neck fracture  Fall at nursing home 2023  Uses walker usually, imaging done. Ortho team consulted, no surgical intervention  Nonweightbearing for 6 weeks. Gentle range of motion exercises and transfer. Another seizure needed  Skilled nursing facility    Closed intra-articular fracture of distal end of the right radius  Patient has splint in place  Neurovascular checks. CT head, CT C-spine-2023-no acute process. Probable calcified meningioma left frontal region measuring 13 mm. Please follow-up. Acute cystitis-suspected  Reported history of frequent UTIs  UA suggestive of infection  Urine culture with E. coli/Proteus  Rocephin given for 5 days and giving Bactrim DS for another 3 days discharge. Right groin wound  Secondary to pure wick suction system  Daily wound care  Bacitracin ointment  Compression fractures  As per care everywhere, patient has lumbar x-ray which revealed compression fracture of the body of the thoracic vertebra, L1 vertebra, and L2 vertebra age indeterminate. No surgical intervention.   PAF  Continue flecainide  Not on anticoagulation presumably due to advanced age and frequent falls  Hypertension  Continue lisinopril,

## 2023-07-14 NOTE — DISCHARGE INSTR - DIET

## 2023-07-14 NOTE — PROGRESS NOTES
Occupational Therapy    Occupational Therapy Treatment Note    Name: Luis Antonio Herron MRN: [id number] :   1919   Date:  2023   Admission Date: 2023 Room:  [address] [place] [address]     Primary Problem: Rt subcapital femoral neck fracture, Rt distal radius fracture    Restrictions/Precautions: General Precautions, Fall Risk, NWB Rt UE and Rt LE, Telemetry, Pulse Ox, BP cuff, Bed/chair alarm     Communication with other providers: PT Timi Alfonso, SILVESTREN Nu    Subjective:  Patient states: \"I love you and everyone that comes in here! \"   Pain: Pt denied pain this date    Objective:    Observation: Pt received in supine upon OT arrival. Pleasantly confused, and participatory with treatment. Objective Measures: Disoriented to time/place/full situation    Treatment, including education:  Self Care Training:   Cues were given for safety, sequence, UE/LE placement, visual cues, and balance. Pt received in supine upon OT arrival. Pt re-educated on role of OT, POC, NWB status Rt UE/LE, and importance of EOB/OOB activity. Pt transferred supine to sitting EOB mod A with HOB elevated to 40'. Pt with improved performance scooting LEs to edge this date but continued to require mod trunk boost for uprighting. Pt able to sit at EOB level CGA progressing to SBA. Pt continued to be dependent for donning BL socks seated EOB. Chair positioned adjacent to bed, and pt completed stand pivot transfer bed to chair mod A x 2 on Lt LE with mod coaching for safe hand placement and maintaining NWB status Rt UE and LE. Seated at chair level, pt completed grooming task of brushing hair min A using Lt hand. Assist required for thoroughness with reaching back of hair. Pt completed self-feeding task of eating ice cream min A with setup. Initial assist required for placement of ice cream onto spoon but pt able to grasp spoon and bring ice cream to mouth.  Pt left positioned for comfort in chair with all lines intact, all needs within reach, and chair alarm on. Assessment / Impression:    Patient's tolerance of treatment: Well  Adverse Reaction: None  Significant change in status and impact: Improved from initial evaluation  Barriers to improvement: None noted      Plan for Next Session:    Pt discharging to [facility] today per charting.     Time in: 1223  Time out: 1240  Timed treatment minutes:  9  Total treatment time: 17    Electronically signed by:    OG Baltazar, [address] [place], [place] [phone]

## 2023-08-16 ENCOUNTER — HOSPITAL ENCOUNTER (EMERGENCY)
Age: 88
Discharge: HOME | End: 2023-08-16
Attending: EMERGENCY MEDICINE
Payer: MEDICARE

## 2023-08-16 ENCOUNTER — APPOINTMENT (OUTPATIENT)
Dept: CT IMAGING | Age: 88
End: 2023-08-16
Attending: EMERGENCY MEDICINE
Payer: MEDICARE

## 2023-08-16 VITALS
WEIGHT: 130 LBS | TEMPERATURE: 97.5 F | OXYGEN SATURATION: 97 % | RESPIRATION RATE: 16 BRPM | DIASTOLIC BLOOD PRESSURE: 69 MMHG | BODY MASS INDEX: 25.52 KG/M2 | SYSTOLIC BLOOD PRESSURE: 117 MMHG | HEART RATE: 104 BPM | HEIGHT: 60 IN

## 2023-08-16 DIAGNOSIS — K62.5 RECTAL BLEEDING: Primary | ICD-10-CM

## 2023-08-16 DIAGNOSIS — K92.2 GASTROINTESTINAL HEMORRHAGE, UNSPECIFIED GASTROINTESTINAL HEMORRHAGE TYPE: ICD-10-CM

## 2023-08-16 LAB
ABO/RH: NORMAL
ALBUMIN SERPL-MCNC: 2.9 GM/DL (ref 3.4–5)
ALP BLD-CCNC: 55 IU/L (ref 40–129)
ALT SERPL-CCNC: 8 U/L (ref 10–40)
ANION GAP SERPL CALCULATED.3IONS-SCNC: 6 MMOL/L (ref 4–16)
ANTIBODY SCREEN: NEGATIVE
AST SERPL-CCNC: 20 IU/L (ref 15–37)
BASOPHILS ABSOLUTE: 0 K/CU MM
BASOPHILS RELATIVE PERCENT: 0.7 % (ref 0–1)
BILIRUB SERPL-MCNC: 0.2 MG/DL (ref 0–1)
BUN SERPL-MCNC: 19 MG/DL (ref 6–23)
CALCIUM SERPL-MCNC: 8.5 MG/DL (ref 8.3–10.6)
CHLORIDE BLD-SCNC: 103 MMOL/L (ref 99–110)
CO2: 29 MMOL/L (ref 21–32)
CREAT SERPL-MCNC: 0.7 MG/DL (ref 0.6–1.1)
DIFFERENTIAL TYPE: ABNORMAL
EOSINOPHILS ABSOLUTE: 0.2 K/CU MM
EOSINOPHILS RELATIVE PERCENT: 3.3 % (ref 0–3)
GFR SERPL CREATININE-BSD FRML MDRD: >60 ML/MIN/1.73M2
GLUCOSE SERPL-MCNC: 79 MG/DL (ref 70–99)
HCT VFR BLD CALC: 28.3 % (ref 37–47)
HEMOGLOBIN: 8.7 GM/DL (ref 12.5–16)
IMMATURE NEUTROPHIL %: 0.9 % (ref 0–0.43)
INR BLD: 1 INDEX
LYMPHOCYTES ABSOLUTE: 2 K/CU MM
LYMPHOCYTES RELATIVE PERCENT: 43.3 % (ref 24–44)
MCH RBC QN AUTO: 28.6 PG (ref 27–31)
MCHC RBC AUTO-ENTMCNC: 30.7 % (ref 32–36)
MCV RBC AUTO: 93.1 FL (ref 78–100)
MONOCYTES ABSOLUTE: 0.6 K/CU MM
MONOCYTES RELATIVE PERCENT: 12.2 % (ref 0–4)
PDW BLD-RTO: 15.8 % (ref 11.7–14.9)
PLATELET # BLD: 237 K/CU MM (ref 140–440)
PMV BLD AUTO: 8.8 FL (ref 7.5–11.1)
POTASSIUM SERPL-SCNC: 5.6 MMOL/L (ref 3.5–5.1)
PROTHROMBIN TIME: 13.7 SECONDS (ref 11.7–14.5)
RBC # BLD: 3.04 M/CU MM (ref 4.2–5.4)
SEGMENTED NEUTROPHILS ABSOLUTE COUNT: 1.8 K/CU MM
SEGMENTED NEUTROPHILS RELATIVE PERCENT: 39.6 % (ref 36–66)
SODIUM BLD-SCNC: 138 MMOL/L (ref 135–145)
TOTAL IMMATURE NEUTOROPHIL: 0.04 K/CU MM
TOTAL PROTEIN: 6.2 GM/DL (ref 6.4–8.2)
WBC # BLD: 4.6 K/CU MM (ref 4–10.5)

## 2023-08-16 PROCEDURE — 74174 CTA ABD&PLVS W/CONTRAST: CPT

## 2023-08-16 PROCEDURE — 86850 RBC ANTIBODY SCREEN: CPT

## 2023-08-16 PROCEDURE — 99285 EMERGENCY DEPT VISIT HI MDM: CPT

## 2023-08-16 PROCEDURE — 85025 COMPLETE CBC W/AUTO DIFF WBC: CPT

## 2023-08-16 PROCEDURE — 86901 BLOOD TYPING SEROLOGIC RH(D): CPT

## 2023-08-16 PROCEDURE — 80053 COMPREHEN METABOLIC PANEL: CPT

## 2023-08-16 PROCEDURE — 6360000004 HC RX CONTRAST MEDICATION: Performed by: EMERGENCY MEDICINE

## 2023-08-16 PROCEDURE — 86900 BLOOD TYPING SEROLOGIC ABO: CPT

## 2023-08-16 PROCEDURE — 85610 PROTHROMBIN TIME: CPT

## 2023-08-16 RX ADMIN — IOPAMIDOL 100 ML: 755 INJECTION, SOLUTION INTRAVENOUS at 12:48

## 2023-08-16 ASSESSMENT — PAIN - FUNCTIONAL ASSESSMENT: PAIN_FUNCTIONAL_ASSESSMENT: NONE - DENIES PAIN

## 2023-08-16 NOTE — ED NOTES
The client is delivered to [facility] ED, room 1, by EMS for the complaint of blood in her diaper per [facility] staff. She has No IV access that was initiated by the EMS personnel. Vital Signs & Transportation interventions have been reported as being performed by the EMS personnel. Upon arrival the client is placed onto an ED bed. She is alert, her breathing is unlabored, chest expansion symmetrical, skin is warm and dry to touch. Her extremities are relaxed and her facial expressions exhibit no facial grimacing. She does mostly verbalize appropriate to questions or comments, has baseline dementia, is hard of hearing, and is mostly able to maintain eye contact, and follows commands. Dr Vitaliy Barnes is at bedside and assesses the client's rectal area to find blood present.       JENNIFER Ny  08/16/23 1134

## 2023-08-16 NOTE — ED NOTES
Attempted IV x2 unsuccessful. Blood was able to be collected before vein infiltrated.      Donte Salamanca RN  08/16/23 0748

## 2023-08-16 NOTE — ED PROVIDER NOTES
Triage Chief Complaint:   Rectal Bleeding (From [address] [facility], reported to have blood in her depends this morning when she was changed by the staff there.)      GERSON:  Cass Wilkinson is a 80 y.o. female that presents patient presents from her rehab facility after finding blood noted in her depends this morning. She denies having any sources of pain. She is relatively quiet, history of dementia, is hard of hearing. Is alert, looking around but will not participate with history, does not provide any details. .    ROS:  Unable to obtain due to patient's dementia    Past Medical History:   Diagnosis Date    Bradycardia 5/22/2017    Glaucoma     H/O cardiovascular stress test 4/14/2011    normal pattern of perfusion in all regions, ef 70    H/O echocardiogram 4/14/2011    mild concentric lvh with preserved systolic and abnormal diastolic fxn [place]>69    [place] (hard of hearing) 10/22/2014    Hypertension     PAF (paroxysmal atrial fibrillation) ([address])     resolved on Flecainide    Risk for falls 5/22/2017    Thyroid disease      Past Surgical History:   Procedure Laterality Date    HYSTERECTOMY       Family History   Problem Relation Age of Onset    Kidney Disease Mother     Stroke Father     Heart Disease Sister     High Blood Pressure Sister     Heart Disease Brother     High Blood Pressure Brother     Cancer Brother     Cancer Sister     Cancer Sister      Social History     Socioeconomic History    Marital status:       Spouse name: Not on file    Number of children: 0    Years of education: Not on file    Highest education level: Not on file   Occupational History    Occupation: volunteer   Tobacco Use    Smoking status: Never    Smokeless tobacco: Never   Vaping Use    Vaping Use: Never used   Substance and Sexual Activity    Alcohol use: No     Comment: Tea daily    Drug use: No    Sexual activity: Not on file   Other Topics Concern    Not on file   Social History Narrative    Not on file     Social Mucous membranes are moist. Tolerates saliva. No trismus. NECK: Supple. No meningismus. Trachea midline. HEART: Bradycardia noted. Radial pulses 2+. LUNGS: Respirations unlabored. CTAB  ABDOMEN: Soft. Non-tender. No guarding or rebound. On  exam, patient has carlin bright red blood per rectum in her depends. She does have 2 or 3 nonthrombosed hemorrhoids, not actively bleeding. EXTREMITIES: No acute deformities. SKIN: Warm and dry. NEUROLOGICAL: No gross facial drooping. Moves all 4 extremities spontaneously.   Will not cooperate for neurologic testing  PSYCHIATRIC: Flat affect    I have reviewed and interpreted all of the currently available lab results from this visit (if applicable):  Results for orders placed or performed during the hospital encounter of 08/16/23   CBC with Auto Differential   Result Value Ref Range    WBC 4.6 4.0 - 10.5 K/CU MM    RBC 3.04 (L) 4.2 - 5.4 M/CU MM    Hemoglobin 8.7 (L) 12.5 - 16.0 GM/DL    Hematocrit 28.3 (L) 37 - 47 %    MCV 93.1 78 - 100 FL    MCH 28.6 27 - 31 PG    MCHC 30.7 (L) 32.0 - 36.0 %    RDW 15.8 (H) 11.7 - 14.9 %    Platelets 243 044 - 957 K/CU MM    MPV 8.8 7.5 - 11.1 FL    Differential Type AUTOMATED DIFFERENTIAL     Segs Relative 39.6 36 - 66 %    Lymphocytes % 43.3 24 - 44 %    Monocytes % 12.2 (H) 0 - 4 %    Eosinophils % 3.3 (H) 0 - 3 %    Basophils % 0.7 0 - 1 %    Segs Absolute 1.8 K/CU MM    Lymphocytes Absolute 2.0 K/CU MM    Monocytes Absolute 0.6 K/CU MM    Eosinophils Absolute 0.2 K/CU MM    Basophils Absolute 0.0 K/CU MM    Immature Neutrophil % 0.9 (H) 0 - 0.43 %    Total Immature Neutrophil 0.04 K/CU MM   CMP   Result Value Ref Range    Sodium 138 135 - 145 MMOL/L    Potassium 5.6 (HH) 3.5 - 5.1 MMOL/L    Chloride 103 99 - 110 mMol/L    CO2 29 21 - 32 MMOL/L    BUN 19 6 - 23 MG/DL    Creatinine 0.7 0.6 - 1.1 MG/DL    Est, Glom Filt Rate >60 >60 mL/min/1.73m2    Glucose 79 70 - 99 MG/DL    Calcium 8.5 8.3 - 10.6 MG/DL    Albumin 2.9 (L) 3.4 - 5.0

## 2023-08-16 NOTE — ED NOTES
Report given to Shamika Marquez, nurse at [facility] and [address] [place] [address]  08/16/23 [id number]

## 2023-08-17 ENCOUNTER — OFFICE VISIT (OUTPATIENT)
Dept: ORTHOPEDIC SURGERY | Age: 88
End: 2023-08-17
Payer: MEDICARE

## 2023-08-17 VITALS
RESPIRATION RATE: 16 BRPM | HEIGHT: 60 IN | BODY MASS INDEX: 25.52 KG/M2 | OXYGEN SATURATION: 97 % | WEIGHT: 130 LBS | HEART RATE: 63 BPM

## 2023-08-17 DIAGNOSIS — S72.044A NONDISPLACED FRACTURE OF BASE OF NECK OF RIGHT FEMUR, INITIAL ENCOUNTER FOR CLOSED FRACTURE (HCC): Primary | ICD-10-CM

## 2023-08-17 DIAGNOSIS — S52.571P OTHER CLOSED INTRA-ARTICULAR FRACTURE OF DISTAL END OF RIGHT RADIUS WITH MALUNION, SUBSEQUENT ENCOUNTER: ICD-10-CM

## 2023-08-17 PROCEDURE — G8419 CALC BMI OUT NRM PARAM NOF/U: HCPCS | Performed by: PHYSICIAN ASSISTANT

## 2023-08-17 PROCEDURE — 4004F PT TOBACCO SCREEN RCVD TLK: CPT | Performed by: PHYSICIAN ASSISTANT

## 2023-08-17 PROCEDURE — 1090F PRES/ABSN URINE INCON ASSESS: CPT | Performed by: PHYSICIAN ASSISTANT

## 2023-08-17 PROCEDURE — 1124F ACP DISCUSS-NO DSCNMKR DOCD: CPT | Performed by: PHYSICIAN ASSISTANT

## 2023-08-17 PROCEDURE — G8427 DOCREV CUR MEDS BY ELIG CLIN: HCPCS | Performed by: PHYSICIAN ASSISTANT

## 2023-08-17 PROCEDURE — 99204 OFFICE O/P NEW MOD 45 MIN: CPT | Performed by: PHYSICIAN ASSISTANT

## 2023-08-17 NOTE — PROGRESS NOTES
Review of Systems   Constitutional: Negative. HENT: Negative. Eyes: Negative. Respiratory: Negative. Cardiovascular: Negative. Gastrointestinal: Negative. Genitourinary: Negative. Musculoskeletal:  Positive for arthralgias. Skin: Negative. Negative for rash and wound. Neurological: Negative. Psychiatric/Behavioral: Negative. Teagan Lerma is a 80 y.o. female presents the office today with a right femoral neck fracture treated conservatively and a right radius fracture with malunion. The wrist is mildly painful and has been in a splint. Past Medical History:   Diagnosis Date    Bradycardia 5/22/2017    Glaucoma     H/O cardiovascular stress test 4/14/2011    normal pattern of perfusion in all regions, ef 70    H/O echocardiogram 4/14/2011    mild concentric lvh with preserved systolic and abnormal diastolic fxn [place]>17    [place] (hard of hearing) 10/22/2014    Hypertension     PAF (paroxysmal atrial fibrillation) ([address])     resolved on Flecainide    Risk for falls 5/22/2017    Thyroid disease        Past Surgical History:   Procedure Laterality Date    HYSTERECTOMY         Family History   Problem Relation Age of Onset    Kidney Disease Mother     Stroke Father     Heart Disease Sister     High Blood Pressure Sister     Heart Disease Brother     High Blood Pressure Brother     Cancer Brother     Cancer Sister     Cancer Sister        Social History     Socioeconomic History    Marital status:      Number of children: 0   Occupational History    Occupation: volunteer   Tobacco Use    Smoking status: Never    Smokeless tobacco: Never   Vaping Use    Vaping Use: Never used   Substance and Sexual Activity    Alcohol use: No     Comment: Tea daily    Drug use: No       Current Outpatient Medications   Medication Sig Dispense Refill    carboxymethylcellulose 1 % ophthalmic solution Place 1 drop into both eyes every 4 hours as needed for Dry Eyes      calcium carbonate

## 2023-08-17 NOTE — PATIENT INSTRUCTIONS
Right wrist Velcro brace  25 % weightbearing on the right leg until the next visit  Ice and elevate as needed  Tylenol or Motrin for pain  Follow up in 6 weeks    We are committed to providing you the best care possible. If you receive a survey after visiting one of our offices, please take time to share your experience concerning your physician office visit. These surveys are confidential and no health information about you is shared. We are eager to improve for you and we are counting on your feedback to help make that happen.

## 2023-09-28 ENCOUNTER — OFFICE VISIT (OUTPATIENT)
Dept: ORTHOPEDIC SURGERY | Age: 88
End: 2023-09-28
Payer: MEDICARE

## 2023-09-28 DIAGNOSIS — S52.571P OTHER CLOSED INTRA-ARTICULAR FRACTURE OF DISTAL END OF RIGHT RADIUS WITH MALUNION, SUBSEQUENT ENCOUNTER: Primary | ICD-10-CM

## 2023-09-28 DIAGNOSIS — S72.044A NONDISPLACED FRACTURE OF BASE OF NECK OF RIGHT FEMUR, INITIAL ENCOUNTER FOR CLOSED FRACTURE (HCC): ICD-10-CM

## 2023-09-28 PROCEDURE — 99212 OFFICE O/P EST SF 10 MIN: CPT | Performed by: PHYSICIAN ASSISTANT

## 2023-09-28 PROCEDURE — 1090F PRES/ABSN URINE INCON ASSESS: CPT | Performed by: PHYSICIAN ASSISTANT

## 2023-09-28 PROCEDURE — 1124F ACP DISCUSS-NO DSCNMKR DOCD: CPT | Performed by: PHYSICIAN ASSISTANT

## 2023-09-28 PROCEDURE — G8428 CUR MEDS NOT DOCUMENT: HCPCS | Performed by: PHYSICIAN ASSISTANT

## 2023-09-28 PROCEDURE — G8419 CALC BMI OUT NRM PARAM NOF/U: HCPCS | Performed by: PHYSICIAN ASSISTANT

## 2023-09-28 PROCEDURE — 4004F PT TOBACCO SCREEN RCVD TLK: CPT | Performed by: PHYSICIAN ASSISTANT

## 2023-09-28 ASSESSMENT — ENCOUNTER SYMPTOMS
EYES NEGATIVE: 1
GASTROINTESTINAL NEGATIVE: 1
RESPIRATORY NEGATIVE: 1

## 2023-09-28 NOTE — PATIENT INSTRUCTIONS
50% weightbearing as pain allows   If patient can not bear weight due to the pain then remain nonweightbearing   Follow up in 6 weeks for right hip x-ray

## 2023-09-28 NOTE — PROGRESS NOTES
Patient returns to the office today for FU of the right wrist fx and right femur head fx DOI 6/29/23. Pt is a poor Historian.  She does state her wrist hurts all the time but denies any pain in the hip

## 2023-11-08 ASSESSMENT — ENCOUNTER SYMPTOMS
RESPIRATORY NEGATIVE: 1
EYES NEGATIVE: 1
GASTROINTESTINAL NEGATIVE: 1

## 2023-11-09 ENCOUNTER — OFFICE VISIT (OUTPATIENT)
Dept: ORTHOPEDIC SURGERY | Age: 88
End: 2023-11-09
Payer: MEDICARE

## 2023-11-09 VITALS
WEIGHT: 130 LBS | HEIGHT: 60 IN | HEART RATE: 76 BPM | BODY MASS INDEX: 25.52 KG/M2 | OXYGEN SATURATION: 94 % | RESPIRATION RATE: 16 BRPM

## 2023-11-09 DIAGNOSIS — S72.044A NONDISPLACED FRACTURE OF BASE OF NECK OF RIGHT FEMUR, INITIAL ENCOUNTER FOR CLOSED FRACTURE (HCC): Primary | ICD-10-CM

## 2023-11-09 PROCEDURE — G8419 CALC BMI OUT NRM PARAM NOF/U: HCPCS | Performed by: PHYSICIAN ASSISTANT

## 2023-11-09 PROCEDURE — G8427 DOCREV CUR MEDS BY ELIG CLIN: HCPCS | Performed by: PHYSICIAN ASSISTANT

## 2023-11-09 PROCEDURE — 1124F ACP DISCUSS-NO DSCNMKR DOCD: CPT | Performed by: PHYSICIAN ASSISTANT

## 2023-11-09 PROCEDURE — 99213 OFFICE O/P EST LOW 20 MIN: CPT | Performed by: PHYSICIAN ASSISTANT

## 2023-11-09 PROCEDURE — 1036F TOBACCO NON-USER: CPT | Performed by: PHYSICIAN ASSISTANT

## 2023-11-09 PROCEDURE — G8484 FLU IMMUNIZE NO ADMIN: HCPCS | Performed by: PHYSICIAN ASSISTANT

## 2023-11-09 PROCEDURE — 1090F PRES/ABSN URINE INCON ASSESS: CPT | Performed by: PHYSICIAN ASSISTANT

## 2023-11-09 ASSESSMENT — ENCOUNTER SYMPTOMS
RESPIRATORY NEGATIVE: 1
GASTROINTESTINAL NEGATIVE: 1
EYES NEGATIVE: 1